# Patient Record
Sex: MALE | Race: WHITE | NOT HISPANIC OR LATINO | Employment: OTHER | URBAN - METROPOLITAN AREA
[De-identification: names, ages, dates, MRNs, and addresses within clinical notes are randomized per-mention and may not be internally consistent; named-entity substitution may affect disease eponyms.]

---

## 2020-04-17 ENCOUNTER — HOSPITAL ENCOUNTER (OUTPATIENT)
Dept: RADIOLOGY | Facility: HOSPITAL | Age: 75
Discharge: HOME | End: 2020-04-17
Attending: ORTHOPAEDIC SURGERY
Payer: COMMERCIAL

## 2020-04-17 VITALS
DIASTOLIC BLOOD PRESSURE: 67 MMHG | HEART RATE: 76 BPM | WEIGHT: 158 LBS | HEIGHT: 65 IN | BODY MASS INDEX: 26.33 KG/M2 | SYSTOLIC BLOOD PRESSURE: 128 MMHG

## 2020-04-17 DIAGNOSIS — M25.512 ACUTE PAIN OF LEFT SHOULDER: ICD-10-CM

## 2020-04-17 DIAGNOSIS — S46.012A ROTATOR CUFF STRAIN, LEFT, INITIAL ENCOUNTER: Primary | ICD-10-CM

## 2020-04-17 PROCEDURE — 73030 X-RAY EXAM OF SHOULDER: CPT

## 2020-04-17 PROCEDURE — 99203 OFFICE O/P NEW LOW 30 MIN: CPT | Performed by: ORTHOPAEDIC SURGERY

## 2020-04-17 PROCEDURE — 20610 DRAIN/INJ JOINT/BURSA W/O US: CPT | Performed by: ORTHOPAEDIC SURGERY

## 2020-04-17 RX ORDER — BETAMETHASONE SODIUM PHOSPHATE AND BETAMETHASONE ACETATE 3; 3 MG/ML; MG/ML
12 INJECTION, SUSPENSION INTRA-ARTICULAR; INTRALESIONAL; INTRAMUSCULAR; SOFT TISSUE
Status: COMPLETED | OUTPATIENT
Start: 2020-04-17 | End: 2020-04-17

## 2020-04-17 RX ORDER — DIPHENOXYLATE HYDROCHLORIDE AND ATROPINE SULFATE 2.5; .025 MG/1; MG/1
1 TABLET ORAL DAILY
COMMUNITY
Start: 2010-11-08

## 2020-04-17 RX ORDER — BUPIVACAINE HYDROCHLORIDE 2.5 MG/ML
2 INJECTION, SOLUTION INFILTRATION; PERINEURAL
Status: COMPLETED | OUTPATIENT
Start: 2020-04-17 | End: 2020-04-17

## 2020-04-17 RX ORDER — LIDOCAINE HYDROCHLORIDE 10 MG/ML
2 INJECTION, SOLUTION INFILTRATION; PERINEURAL
Status: COMPLETED | OUTPATIENT
Start: 2020-04-17 | End: 2020-04-17

## 2020-04-17 RX ADMIN — BUPIVACAINE HYDROCHLORIDE 2 ML: 2.5 INJECTION, SOLUTION INFILTRATION; PERINEURAL at 11:57

## 2020-04-17 RX ADMIN — LIDOCAINE HYDROCHLORIDE 2 ML: 10 INJECTION, SOLUTION INFILTRATION; PERINEURAL at 11:57

## 2020-04-17 RX ADMIN — BETAMETHASONE SODIUM PHOSPHATE AND BETAMETHASONE ACETATE 12 MG: 3; 3 INJECTION, SUSPENSION INTRA-ARTICULAR; INTRALESIONAL; INTRAMUSCULAR; SOFT TISSUE at 11:57

## 2020-04-24 DIAGNOSIS — S43.402A SPRAIN OF LEFT SHOULDER, UNSPECIFIED SHOULDER SPRAIN TYPE, INITIAL ENCOUNTER: Primary | ICD-10-CM

## 2020-04-29 ENCOUNTER — EVALUATION (OUTPATIENT)
Dept: PHYSICAL THERAPY | Facility: CLINIC | Age: 75
End: 2020-04-29
Payer: COMMERCIAL

## 2020-04-29 DIAGNOSIS — S46.012D ROTATOR CUFF STRAIN, LEFT, SUBSEQUENT ENCOUNTER: Primary | ICD-10-CM

## 2020-04-29 DIAGNOSIS — S43.402A SPRAIN OF LEFT SHOULDER, UNSPECIFIED SHOULDER SPRAIN TYPE, INITIAL ENCOUNTER: ICD-10-CM

## 2020-04-29 PROCEDURE — 97110 THERAPEUTIC EXERCISES: CPT | Performed by: PHYSICAL THERAPIST

## 2020-04-29 PROCEDURE — 97162 PT EVAL MOD COMPLEX 30 MIN: CPT | Performed by: PHYSICAL THERAPIST

## 2020-05-06 ENCOUNTER — OFFICE VISIT (OUTPATIENT)
Dept: PHYSICAL THERAPY | Facility: CLINIC | Age: 75
End: 2020-05-06
Payer: COMMERCIAL

## 2020-05-06 DIAGNOSIS — S46.012D ROTATOR CUFF STRAIN, LEFT, SUBSEQUENT ENCOUNTER: Primary | ICD-10-CM

## 2020-05-06 DIAGNOSIS — S43.402A SPRAIN OF LEFT SHOULDER, UNSPECIFIED SHOULDER SPRAIN TYPE, INITIAL ENCOUNTER: ICD-10-CM

## 2020-05-06 PROCEDURE — 97110 THERAPEUTIC EXERCISES: CPT | Performed by: PHYSICAL THERAPIST

## 2020-05-06 PROCEDURE — 97140 MANUAL THERAPY 1/> REGIONS: CPT | Performed by: PHYSICAL THERAPIST

## 2020-05-13 ENCOUNTER — OFFICE VISIT (OUTPATIENT)
Dept: PHYSICAL THERAPY | Facility: CLINIC | Age: 75
End: 2020-05-13
Payer: COMMERCIAL

## 2020-05-13 DIAGNOSIS — S43.402A SPRAIN OF LEFT SHOULDER, UNSPECIFIED SHOULDER SPRAIN TYPE, INITIAL ENCOUNTER: ICD-10-CM

## 2020-05-13 DIAGNOSIS — S46.012D ROTATOR CUFF STRAIN, LEFT, SUBSEQUENT ENCOUNTER: Primary | ICD-10-CM

## 2020-05-13 PROCEDURE — 97140 MANUAL THERAPY 1/> REGIONS: CPT | Performed by: PHYSICAL THERAPIST

## 2020-05-13 PROCEDURE — 97110 THERAPEUTIC EXERCISES: CPT | Performed by: PHYSICAL THERAPIST

## 2020-05-19 ENCOUNTER — APPOINTMENT (OUTPATIENT)
Dept: PHYSICAL THERAPY | Facility: CLINIC | Age: 75
End: 2020-05-19
Payer: COMMERCIAL

## 2020-05-20 ENCOUNTER — APPOINTMENT (OUTPATIENT)
Dept: PHYSICAL THERAPY | Facility: CLINIC | Age: 75
End: 2020-05-20
Payer: COMMERCIAL

## 2020-05-21 ENCOUNTER — OFFICE VISIT (OUTPATIENT)
Dept: PHYSICAL THERAPY | Facility: CLINIC | Age: 75
End: 2020-05-21
Payer: COMMERCIAL

## 2020-05-21 DIAGNOSIS — S46.012D ROTATOR CUFF STRAIN, LEFT, SUBSEQUENT ENCOUNTER: Primary | ICD-10-CM

## 2020-05-21 DIAGNOSIS — S43.402A SPRAIN OF LEFT SHOULDER, UNSPECIFIED SHOULDER SPRAIN TYPE, INITIAL ENCOUNTER: ICD-10-CM

## 2020-05-21 PROCEDURE — 97110 THERAPEUTIC EXERCISES: CPT | Performed by: PHYSICAL THERAPIST

## 2020-05-21 PROCEDURE — 97140 MANUAL THERAPY 1/> REGIONS: CPT | Performed by: PHYSICAL THERAPIST

## 2020-05-28 ENCOUNTER — OFFICE VISIT (OUTPATIENT)
Dept: PHYSICAL THERAPY | Facility: CLINIC | Age: 75
End: 2020-05-28
Payer: COMMERCIAL

## 2020-05-28 DIAGNOSIS — S46.012D ROTATOR CUFF STRAIN, LEFT, SUBSEQUENT ENCOUNTER: Primary | ICD-10-CM

## 2020-05-28 PROCEDURE — 97140 MANUAL THERAPY 1/> REGIONS: CPT | Performed by: PHYSICAL THERAPIST

## 2020-05-28 PROCEDURE — 97110 THERAPEUTIC EXERCISES: CPT | Performed by: PHYSICAL THERAPIST

## 2020-06-04 ENCOUNTER — OFFICE VISIT (OUTPATIENT)
Dept: PHYSICAL THERAPY | Facility: CLINIC | Age: 75
End: 2020-06-04
Payer: COMMERCIAL

## 2020-06-04 DIAGNOSIS — S43.402A SPRAIN OF LEFT SHOULDER, UNSPECIFIED SHOULDER SPRAIN TYPE, INITIAL ENCOUNTER: ICD-10-CM

## 2020-06-04 DIAGNOSIS — S46.012D ROTATOR CUFF STRAIN, LEFT, SUBSEQUENT ENCOUNTER: Primary | ICD-10-CM

## 2020-06-04 PROCEDURE — 97140 MANUAL THERAPY 1/> REGIONS: CPT | Performed by: PHYSICAL THERAPIST

## 2020-06-04 PROCEDURE — 97110 THERAPEUTIC EXERCISES: CPT | Performed by: PHYSICAL THERAPIST

## 2020-06-11 ENCOUNTER — OFFICE VISIT (OUTPATIENT)
Dept: PHYSICAL THERAPY | Facility: CLINIC | Age: 75
End: 2020-06-11
Payer: COMMERCIAL

## 2020-06-11 DIAGNOSIS — S43.402A SPRAIN OF LEFT SHOULDER, UNSPECIFIED SHOULDER SPRAIN TYPE, INITIAL ENCOUNTER: ICD-10-CM

## 2020-06-11 DIAGNOSIS — S46.012D ROTATOR CUFF STRAIN, LEFT, SUBSEQUENT ENCOUNTER: Primary | ICD-10-CM

## 2020-06-11 PROCEDURE — 97140 MANUAL THERAPY 1/> REGIONS: CPT | Performed by: PHYSICAL THERAPIST

## 2020-06-11 PROCEDURE — 97110 THERAPEUTIC EXERCISES: CPT | Performed by: PHYSICAL THERAPIST

## 2020-06-18 ENCOUNTER — OFFICE VISIT (OUTPATIENT)
Dept: PHYSICAL THERAPY | Facility: CLINIC | Age: 75
End: 2020-06-18
Payer: COMMERCIAL

## 2020-06-18 DIAGNOSIS — S43.402A SPRAIN OF LEFT SHOULDER, UNSPECIFIED SHOULDER SPRAIN TYPE, INITIAL ENCOUNTER: ICD-10-CM

## 2020-06-18 DIAGNOSIS — S46.012D ROTATOR CUFF STRAIN, LEFT, SUBSEQUENT ENCOUNTER: Primary | ICD-10-CM

## 2020-06-18 PROCEDURE — 97140 MANUAL THERAPY 1/> REGIONS: CPT | Performed by: PHYSICAL THERAPIST

## 2020-11-13 ENCOUNTER — PREP FOR PROCEDURE (OUTPATIENT)
Dept: GASTROENTEROLOGY | Facility: CLINIC | Age: 75
End: 2020-11-13

## 2020-11-13 DIAGNOSIS — Z86.010 HISTORY OF COLON POLYPS: Primary | ICD-10-CM

## 2021-01-15 ENCOUNTER — TELEMEDICINE (OUTPATIENT)
Dept: GASTROENTEROLOGY | Facility: CLINIC | Age: 76
End: 2021-01-15

## 2021-01-15 VITALS — HEIGHT: 65 IN | BODY MASS INDEX: 26.66 KG/M2 | WEIGHT: 160 LBS

## 2021-01-15 DIAGNOSIS — Z80.0 FHX: COLON CANCER: Primary | ICD-10-CM

## 2021-01-15 NOTE — PROGRESS NOTES
Why does your doctor want you to have this procedure? Hx polyps; fhx colon ca-mother    Do you have kidney disease?  no  If yes, are you on dialysis :     Have you had diverticulitis within the past 2 months? no    Are you diabetic?  no  If yes, insulin dependent: If yes, provide diabetic instructions sheet     Do take iron supplements?  no  If yes, instruct patient to hold iron supplement for 7 days prior    Are you on a blood thinner? no   Was the blood thinner sheet complete and faxed to cardiologist no  Plavix (clopidogrel), Coumadin (warfarin), Lovenox (enoxaparin), Xarelto (rivaroxaban), Pradaxa(dabigatran), Eliquis(apixaban) Savaysa/Lixiana (edoxapan)    Do you have an automatic implantable cardiac defibrillator (AICD)/pacemaker ([facility])? no  Was AICD/pacemaker sheet completed and faxed to cardiologist? no    Are you on home oxygen? no  If yes, continuous or nocturnal:     Have you been treated for MRSA, VRE or any communicable diseases? no    Heart attack, stroke, or stent within 3 months? no  Schedule at Hospital if within 3-6 months   Use nitroglycerin for chest pain in the last 6 months? no    History of organ  transplant?  no   If yes, notify Endo      History of neck/throat/tongue surgery or cancer? no  IF yes, notify Endo      Any problems with anesthesia in the past? no     Was stool C diff ordered?  no Stool specimen needs to be completed prior to procedure    Do have any facial or body piercings?no     Do you have a latex allergy? no     Do have an allergy to metals? (Bravo study only) no     If pediatric patient, was consent faxed to pediatrician no     Patient rights reviewed yes     Colon phone prep completed; suprep instructions reviewed and emailed to pt; rx forwarded to provider

## 2021-01-20 ENCOUNTER — ANESTHESIA EVENT (OUTPATIENT)
Dept: GASTROENTEROLOGY | Facility: AMBULATORY SURGERY CENTER | Age: 76
End: 2021-01-20

## 2021-01-20 ENCOUNTER — HOSPITAL ENCOUNTER (OUTPATIENT)
Dept: GASTROENTEROLOGY | Facility: AMBULATORY SURGERY CENTER | Age: 76
Discharge: HOME | End: 2021-01-20
Payer: COMMERCIAL

## 2021-01-20 ENCOUNTER — ANESTHESIA (OUTPATIENT)
Dept: GASTROENTEROLOGY | Facility: AMBULATORY SURGERY CENTER | Age: 76
End: 2021-01-20

## 2021-01-20 VITALS
RESPIRATION RATE: 18 BRPM | SYSTOLIC BLOOD PRESSURE: 141 MMHG | OXYGEN SATURATION: 98 % | DIASTOLIC BLOOD PRESSURE: 67 MMHG | HEART RATE: 65 BPM | TEMPERATURE: 97.4 F

## 2021-01-20 VITALS — HEART RATE: 54 BPM

## 2021-01-20 DIAGNOSIS — Z86.010 HISTORY OF COLON POLYPS: ICD-10-CM

## 2021-01-20 PROCEDURE — 45385 COLONOSCOPY W/LESION REMOVAL: CPT | Performed by: INTERNAL MEDICINE

## 2021-01-20 PROCEDURE — 88305 TISSUE EXAM BY PATHOLOGIST: CPT | Performed by: PATHOLOGY

## 2021-01-20 RX ORDER — SODIUM CHLORIDE 9 MG/ML
50 INJECTION, SOLUTION INTRAVENOUS CONTINUOUS
Status: DISCONTINUED | OUTPATIENT
Start: 2021-01-20 | End: 2021-01-20

## 2021-01-20 RX ORDER — PROPOFOL 10 MG/ML
INJECTION, EMULSION INTRAVENOUS AS NEEDED
Status: DISCONTINUED | OUTPATIENT
Start: 2021-01-20 | End: 2021-01-20

## 2021-01-20 RX ADMIN — PROPOFOL 50 MG: 10 INJECTION, EMULSION INTRAVENOUS at 10:33

## 2021-01-20 RX ADMIN — PROPOFOL 50 MG: 10 INJECTION, EMULSION INTRAVENOUS at 10:26

## 2021-01-20 RX ADMIN — PROPOFOL 30 MG: 10 INJECTION, EMULSION INTRAVENOUS at 10:37

## 2021-01-20 RX ADMIN — SODIUM CHLORIDE 50 ML/HR: 9 INJECTION, SOLUTION INTRAVENOUS at 10:18

## 2021-01-20 RX ADMIN — PROPOFOL 70 MG: 10 INJECTION, EMULSION INTRAVENOUS at 10:18

## 2021-01-20 RX ADMIN — PROPOFOL 30 MG: 10 INJECTION, EMULSION INTRAVENOUS at 10:31

## 2021-01-20 NOTE — ANESTHESIA POSTPROCEDURE EVALUATION
Post-Op Assessment Note    CV Status:  Stable    Pain management: adequate     Mental Status:  Sleepy   Hydration Status:  Euvolemic and stable   PONV Controlled:  None   Airway Patency:  Patent      Post Op Vitals Reviewed: Yes      Staff: Anesthesiologist         No complications documented      BP      Temp     Pulse     Resp      SpO2

## 2021-01-20 NOTE — ANESTHESIA PREPROCEDURE EVALUATION
Procedure:  COLONOSCOPY    Relevant Problems   CARDIO   (+) PVC's (premature ventricular contractions)      /RENAL   (+) Prostate CA (HCC)      Other   (+) Syncope        Physical Exam    Airway    Mallampati score: II  TM Distance: >3 FB  Neck ROM: full     Dental   No notable dental hx     Cardiovascular  Cardiovascular exam normal    Pulmonary  Pulmonary exam normal     Other Findings        Anesthesia Plan  ASA Score- 2     Anesthesia Type- IV sedation with anesthesia with ASA Monitors  Additional Monitors:   Airway Plan:           Plan Factors-    Chart reviewed  Patient is not a current smoker  Induction- intravenous  Postoperative Plan-     Informed Consent- Anesthetic plan and risks discussed with patient

## 2021-01-20 NOTE — H&P
History and Physical - [facility]  Adeola Donovan 66 y o  male MRN: [id number]    HPI: Adeola Donovan is a 66y o  year old male who presents for surveillance colonoscopy  He has a history of colon polyps and family history of colon cancer    REVIEW OF SYSTEMS: Per the HPI, and otherwise unremarkable  Historical Information   Past Medical History:   Diagnosis Date    Colon polyp     Dermatitis     Prostate CA ([address] )     Rotator cuff injury     left    Syncope     6-7 years ago and 10-15 years ago  Past Surgical History:   Procedure Laterality Date    ANKLE FRACTURE SURGERY      CHOLECYSTECTOMY      COLONOSCOPY      PROSTATE CANCER GENE 3(PCA3) (HISTORICAL)  04/2019     with radiation     Social History   Social History     Substance and Sexual Activity   Alcohol Use Yes    Frequency: 4 or more times a week    Comment: Social     Social History     Substance and Sexual Activity   Drug Use No     Social History     Tobacco Use   Smoking Status Former Smoker    Packs/day: 0 25    Years: 1 00    Pack years: 0 25   Smokeless Tobacco Never Used     Family History   Problem Relation Age of Onset    Colon cancer Mother     Diabetes Father        Meds/Allergies       Current Outpatient Medications:     Multiple Vitamins-Minerals (MULTIVITAMIN WITH MINERALS) tablet    multivitamin (THERAGRAN) TABS    Na Sulfate-K Sulfate-Mg Sulf 17 5-3 13-1 6 GM/177ML SOLN    Current Facility-Administered Medications:     sodium chloride 0 9 % infusion, 50 mL/hr, Intravenous, Continuous    No Known Allergies    Objective     Temp (!) 97 4 °F (36 3 °C) (Temporal)     PHYSICAL EXAM    Gen: NAD AAOx3  CV: S1S2 RRR no m/r/g  CHEST: Clear b/l no c/r/w  ABD: soft, +BS NT/ND  EXT: no edema    ASSESSMENT/PLAN:  This is a 66y o  year old male here for surveillance colonoscopy, and he is stable and optimized for his procedure

## 2021-01-20 NOTE — DISCHARGE INSTRUCTIONS
Hemorrhoids   WHAT YOU NEED TO KNOW:   What are hemorrhoids? Hemorrhoids are swollen blood vessels inside your rectum (internal hemorrhoids) or on your anus (external hemorrhoids)  Sometimes a hemorrhoid may prolapse  This means it extends out of your anus  What increases my risk for hemorrhoids? · Pregnancy or obesity    · Straining or sitting for a long time during bowel movements    · Liver disease    · Weak muscles around the anus caused by older age, rectal surgery, or anal intercourse    · A lack of physical activity    · Chronic diarrhea or constipation    · A low-fiber diet    What are the signs and symptoms of hemorrhoids? · Pain or itching around your anus or inside your rectum    · Swelling or bumps around your anus    · Bright red blood in your bowel movement, on the toilet paper, or in the toilet bowl    · Tissue bulging out of your anus (prolapsed hemorrhoids)    · Incontinence (poor control over urine or bowel movements)    How are hemorrhoids diagnosed? Your healthcare provider will ask about your symptoms, the foods you eat, and your bowel movements  He or she will examine your anus for external hemorrhoids  You may need the following:  · A digital rectal exam  is a test to check for hemorrhoids  Your healthcare provider will put a gloved finger inside your anus to feel for the hemorrhoids  · An anoscopy  is a test that uses a scope (small tube with a light and camera on the end) to look at your hemorrhoids  How are hemorrhoids treated? Treatment will depend on your symptoms  You may need any of the following:  · Medicines  can help decrease pain and swelling, and soften your bowel movement  The medicine may be a pill, pad, cream, or ointment  · Procedures  may be used to shrink or remove your hemorrhoid  Examples include rubber-band ligation, sclerotherapy, and photocoagulation  These procedures may be done in your healthcare provider's office   Ask your healthcare provider for more information about these procedures  · Surgery  may be needed to shrink or remove your hemorrhoids  How can I manage my symptoms? · Apply ice on your anus for 15 to 20 minutes every hour or as directed  Use an ice pack, or put crushed ice in a plastic bag  Cover it with a towel before you apply it to your anus  Ice helps prevent tissue damage and decreases swelling and pain  · Take a sitz bath  Fill a bathtub with 4 to 6 inches of warm water  You may also use a sitz bath pan that fits inside a toilet bowl  Sit in the sitz bath for 15 minutes  Do this 3 times a day, and after each bowel movement  The warm water can help decrease pain and swelling  · Keep your anal area clean  Gently wash the area with warm water daily  Soap may irritate the area  After a bowel movement, wipe with moist towelettes or wet toilet paper  Dry toilet paper can irritate the area  How can I help prevent hemorrhoids? · Do not strain to have a bowel movement  Do not sit on the toilet too long  These actions can increase pressure on the tissues in your rectum and anus  · Drink plenty of liquids  Liquids can help prevent constipation  Ask how much liquid to drink each day and which liquids are best for you  · Eat a variety of high-fiber foods  Examples include fruits, vegetables, and whole grains  Ask your healthcare provider how much fiber you need each day  You may need to take a fiber supplement  · Exercise as directed  Exercise, such as walking, may make it easier to have a bowel movement  Ask your healthcare provider to help you create an exercise plan  · Do not have anal sex  Anal sex can weaken the skin around your rectum and anus  · Avoid heavy lifting  This can cause straining and increase your risk for another hemorrhoid  When should I seek immediate care? · You have severe pain in your rectum or around your anus      · You have severe pain in your abdomen and you are vomiting  · You have bleeding from your anus that soaks through your underwear  When should I contact my healthcare provider? · You have frequent and painful bowel movements  · Your hemorrhoid looks or feels more swollen than usual      · You do not have a bowel movement for 2 days or more  · You see or feel tissue coming through your anus  · You have questions or concerns about your condition or care  CARE AGREEMENT:   You have the right to help plan your care  Learn about your health condition and how it may be treated  Discuss treatment options with your healthcare providers to decide what care you want to receive  You always have the right to refuse treatment  The above information is an  only  It is not intended as medical advice for individual conditions or treatments  Talk to your doctor, nurse or pharmacist before following any medical regimen to see if it is safe and effective for you  © Copyright [address] Information is for End User's use only and may not be sold, redistributed or otherwise used for commercial purposes  All illustrations and images included in CareNotes® are the copyrighted property of [employer]  or [address] [place] [address] Kimberli   Diverticulosis   WHAT YOU NEED TO KNOW:   What is diverticulosis? Diverticulosis is a condition that causes small pockets called diverticula to form in your intestine  These pockets make it difficult for bowel movements to pass through your digestive system  What causes diverticulosis? Diverticula form when muscles have to work hard to move bowel movements through the intestine  The force causes bulges to form at weak areas in the intestine  This may happen if you eat foods that are low in fiber  Fiber helps give your bowel movements more bulk so they are larger and easier to move through your colon   The following may increase your risk of diverticulosis:  · A history of constipation    · Age 36 or older    · Obesity    · Lack of exercise    What are the signs and symptoms of diverticulosis? Diverticulosis usually does not cause any signs or symptoms  It may cause any of the following in some people:  · Pain or discomfort in your lower abdomen    · Abdominal bloating    · Constipation or diarrhea    How is diverticulosis diagnosed? Your healthcare provider will examine you and ask about your bowel movements, diet, and symptoms  He or she will also ask about any medical conditions you have or medicines you take  You may need any of the following:  · Blood tests  may be done to check for signs of inflammation  · A barium enema  is an x-ray of your colon that may show diverticula  A tube is put into your anus, and a liquid called barium is put through the tube  Barium is used so that healthcare providers can see your colon more clearly  · Flexible sigmoidoscopy  is a test to look for any changes in your lower intestines and rectum  It may also show the cause of any bleeding or pain  A soft, bendable tube with a light on the end will be put into your anus  It will then be moved forward into your intestine  · A colonoscopy  is used to look at your whole colon  A scope (long bendable tube with a light on the end) is used to take pictures  This test may show diverticula  · A CT scan , or CAT scan, may show diverticula  You may be given contrast liquid before the scan  Tell the healthcare provider if you have ever had an allergic reaction to contrast liquid  How is diverticulosis managed? The goal of treatment is to manage any symptoms you have and prevent other problems such as diverticulitis  Diverticulitis is swelling or infection of the diverticula  Your healthcare provider may recommend any of the following:  · Eat a variety of high-fiber foods  High-fiber foods help you have regular bowel movements  High-fiber foods include cooked beans, fruits, vegetables, and some cereals   Most adults need 25 to 35 grams of fiber each day  Your healthcare provider may recommend that you have more  Ask your healthcare provider how much fiber you need  Increase fiber slowly  You may have abdominal discomfort, bloating, and gas if you add fiber to your diet too quickly  You may need to take a fiber supplement if you are not getting enough fiber from food  · Medicines  to soften your bowel movements may be given  You may also need medicines to treat symptoms such as bloating and pain  · Drink liquids as directed  You may need to drink 2 to 3 liters (8 to 12 cups) of liquids every day  Ask your healthcare provider how much liquid to drink each day and which liquids are best for you  · Apply heat  on your abdomen for 20 to 30 minutes every 2 hours for as many days as directed  Heat helps decrease pain and muscle spasms  How can I help prevent diverticulitis or other symptoms? The following may help decrease your risk for diverticulitis or symptoms, such as bleeding  Talk to your provider about these or other things you can do to prevent problems that may occur with diverticulosis  · Exercise regularly  Ask your healthcare provider about the best exercise plan for you  Exercise can help you have regular bowel movements  Get 30 minutes of exercise on most days of the week  · Maintain a healthy weight  Ask your healthcare provider how much you should weigh  Ask him or her to help you create a weight loss plan if you are overweight  · Do not smoke  Nicotine and other chemicals in cigarettes increase your risk for diverticulitis  Ask your healthcare provider for information if you currently smoke and need help to quit  E-cigarettes or smokeless tobacco still contain nicotine  Talk to your healthcare provider before you use these products  · Ask your healthcare provider if it is safe to take NSAIDs  NSAIDs may increase your risk of diverticulitis  When should I seek immediate care? · You have severe pain on the left side of your lower abdomen  · Your bowel movements are bright or dark red  When should I contact my healthcare provider? · You have a fever and chills  · You feel dizzy or lightheaded  · You have nausea, or you are vomiting  · You have a change in your bowel movements  · You have questions or concerns about your condition or care  CARE AGREEMENT:   You have the right to help plan your care  Learn about your health condition and how it may be treated  Discuss treatment options with your healthcare providers to decide what care you want to receive  You always have the right to refuse treatment  The above information is an  only  It is not intended as medical advice for individual conditions or treatments  Talk to your doctor, nurse or pharmacist before following any medical regimen to see if it is safe and effective for you  © Copyright [address] Information is for End User's use only and may not be sold, redistributed or otherwise used for commercial purposes  All illustrations and images included in CareNotes® are the copyrighted property of [employer]  or [address] [place] [address]  Colorectal Polyps   WHAT YOU NEED TO KNOW:   What are colorectal polyps? Colorectal polyps are small growths of tissue in the lining of the colon and rectum  Most polyps are hyperplastic polyps and are usually benign (noncancerous)  Certain types of polyps, called adenomatous polyps, may turn into cancer  What increases my risk of colorectal polyps? The exact cause of colorectal polyps is unknown   The following may increase your risk:  · Older age    · A diet of foods high in fat and low in fiber     · Family history of polyps    · Intestinal diseases, such as Crohn's disease or ulcerative colitis    · An unhealthy lifestyle, such as physical inactivity, smoking, or drinking alcohol    · Obesity    What are the signs and symptoms of colorectal polyps? · Blood in your bowel movement or bleeding from the rectum    · Change in bowel movement habits, such as diarrhea and constipation    · Abdominal pain    How are colorectal polyps diagnosed? You should have fecal blood screening once a year for colorectal disease if you are over 48years old  You should be screened earlier if you have an intestinal disease or a family history of polyps or colorectal cancer  During this screening, a sample of your bowel movement is checked for blood, which may be an early sign of colorectal polyps or cancer  You may also need any of the following tests:  · Digital rectal exam:  Your healthcare provider will examine your anus and use a finger to check your rectum for polyps  · Barium enema: A barium enema is an x-ray of the colon  A tube is put into your anus, and a liquid called barium is put through the tube  Barium is used so that healthcare providers can see your colon better on the x-ray film  · Virtual colonoscopy: This is a CT scan that takes pictures of the inside of your colon and rectum  A small, flexible tube is put into your rectum and air or carbon dioxide (gas) is used to expand your colon  This lets healthcare providers clearly see your colon and any polyps on a monitor  · Colonoscopy or sigmoidoscopy: These procedures help your healthcare provider see the inside of your colon using a flexible tube with a small light and camera on the end  During a sigmoidoscopy, your healthcare provider will only look at rectum and lower colon  During a colonoscopy, healthcare providers will look at the full length of your colon  Healthcare providers may remove a small amount of tissue from the colon for a biopsy  How are colorectal polyps treated? A polypectomy is a minimally invasive procedure to remove your polyps  They may be removed during a colonoscopy or sigmoidoscopy  Your healthcare provider may need to remove the polyps with a laparoscope  Laparoscopy is done by inserting a small, flexible scope into incisions made on your abdomen  What are the risks of colorectal polyps? You may bleed during a colonoscopy procedure  Your bowel may be perforated (torn) when polyps are removed  This may lead to an open abdominal surgery  During surgery, you may bleed too much or get an infection  Adenomatous polyps that are not removed may turn into cancer and become more difficult to treat  Where can I find support and more information? · [facility] ([facility]) [address] [place], [place] [address]  Phone: [phone]  Web Address: Robson Osborn  [facility] nih gov    When should I contact my healthcare provider? · You have a fever  · You have chills, a cough, or feel weak and achy  · You have abdominal pain that does not go away or gets worse after you take medicine  · Your abdomen is swollen  · You are losing weight without trying  · You have questions or concerns about your condition or care  When should I seek immediate care or call 911? · You have sudden shortness of breath  · You have a fast heart rate, fast breathing, or are too dizzy to stand up  · You have severe abdominal pain  · You see blood in your bowel movement  CARE AGREEMENT:   You have the right to help plan your care  Learn about your health condition and how it may be treated  Discuss treatment options with your healthcare providers to decide what care you want to receive  You always have the right to refuse treatment  The above information is an  only  It is not intended as medical advice for individual conditions or treatments  Talk to your doctor, nurse or pharmacist before following any medical regimen to see if it is safe and effective for you    © Copyright [address] Information is for End User's use only and may not be sold, redistributed or otherwise used for commercial purposes   All illustrations and images included in CareNotes® are the copyrighted property of [employer]  or Tony Schultz

## 2021-01-25 NOTE — RESULT ENCOUNTER NOTE
I called the patient and discussed pathology results  He had 2 small adenomas likely low risk  No recall colonoscopy given his age  He is in agreement  Copy to the patient's primary care provider    Thank you

## 2021-02-12 DIAGNOSIS — Z23 ENCOUNTER FOR IMMUNIZATION: ICD-10-CM

## 2023-05-17 ENCOUNTER — OFFICE VISIT (OUTPATIENT)
Dept: OBGYN CLINIC | Facility: HOSPITAL | Age: 78
End: 2023-05-17

## 2023-05-17 ENCOUNTER — HOSPITAL ENCOUNTER (OUTPATIENT)
Dept: RADIOLOGY | Facility: HOSPITAL | Age: 78
Discharge: HOME | End: 2023-05-17
Attending: ORTHOPAEDIC SURGERY

## 2023-05-17 VITALS
HEART RATE: 80 BPM | BODY MASS INDEX: 28.82 KG/M2 | WEIGHT: 173 LBS | DIASTOLIC BLOOD PRESSURE: 72 MMHG | HEIGHT: 65 IN | SYSTOLIC BLOOD PRESSURE: 144 MMHG

## 2023-05-17 DIAGNOSIS — M25.461 EFFUSION OF RIGHT KNEE: ICD-10-CM

## 2023-05-17 DIAGNOSIS — M25.561 RIGHT KNEE PAIN, UNSPECIFIED CHRONICITY: ICD-10-CM

## 2023-05-17 DIAGNOSIS — M17.11 PRIMARY OSTEOARTHRITIS OF RIGHT KNEE: ICD-10-CM

## 2023-05-17 DIAGNOSIS — M25.561 RIGHT KNEE PAIN, UNSPECIFIED CHRONICITY: Primary | ICD-10-CM

## 2023-05-17 RX ORDER — BETAMETHASONE SODIUM PHOSPHATE AND BETAMETHASONE ACETATE 3; 3 MG/ML; MG/ML
12 INJECTION, SUSPENSION INTRA-ARTICULAR; INTRALESIONAL; INTRAMUSCULAR; SOFT TISSUE
Status: COMPLETED | OUTPATIENT
Start: 2023-05-17 | End: 2023-05-17

## 2023-05-17 RX ORDER — BUPIVACAINE HYDROCHLORIDE 2.5 MG/ML
2 INJECTION, SOLUTION INFILTRATION; PERINEURAL
Status: COMPLETED | OUTPATIENT
Start: 2023-05-17 | End: 2023-05-17

## 2023-05-17 RX ADMIN — BETAMETHASONE SODIUM PHOSPHATE AND BETAMETHASONE ACETATE 12 MG: 3; 3 INJECTION, SUSPENSION INTRA-ARTICULAR; INTRALESIONAL; INTRAMUSCULAR; SOFT TISSUE at 12:44

## 2023-05-17 RX ADMIN — BUPIVACAINE HYDROCHLORIDE 2 ML: 2.5 INJECTION, SOLUTION INFILTRATION; PERINEURAL at 12:44

## 2023-05-17 NOTE — PROGRESS NOTES
[de-identified] y o male presents for evaluation of rather acute onset of pain and swelling in his right knee  He describes no prodromal injury to the knee  He describes the rapid onset of weightbearing pain in the right knee  He describes pain at the level of right knee joint, the pain is made worse bearing weight, the pain increases with increased activities  In addition he describes a feeling of fullness in the right knee that he has not had prior    Review of Systems  Review of systems negative unless otherwise specified in HPI    Past Medical History  Past Medical History:   Diagnosis Date   • Colon polyp    • Dermatitis    • Prostate CA ([facility] [address] )    • Rotator cuff injury     left   • Syncope     6-7 years ago and 10-15 years ago  Past Surgical History  Past Surgical History:   Procedure Laterality Date   • ANKLE FRACTURE SURGERY     • CHOLECYSTECTOMY     • COLONOSCOPY     • PROSTATE CANCER GENE 3(PCA3) (HISTORICAL)  04/2019     with radiation       Current Medications  Current Outpatient Medications on File Prior to Visit   Medication Sig Dispense Refill   • Multiple Vitamins-Minerals (MULTIVITAMIN WITH MINERALS) tablet Take 1 tablet by mouth daily  • multivitamin (THERAGRAN) TABS Take 1 tablet by mouth daily       No current facility-administered medications on file prior to visit  Recent Labs [facility])  0   Lab Value Date/Time    HCT 41 8 06/08/2016 0959    HGB 14 4 06/08/2016 0959    WBC 13 90 (H) 06/08/2016 0959    HGBA1C 5 8 (H) 10/18/2022 1000         Physical exam  · General: Awake, Alert, Oriented  · Eyes: Pupils equal, round and reactive to light  · Heart: regular rate and rhythm  · Lungs: No audible wheezing  · Abdomen: soft  Examination finds a gait pattern with antalgia  Right hip moves well  Right thigh is devoid of atrophy  Right knee is in valgus orientation  There is a small to moderate-sized effusion  He has intact extensor mechanism and can flex well    There is crepitation with flexion extension in the patellofemoral reticulation, there is bony enlargement tenderness on the lateral joint line, with very little bony enlargement very little tenderness medially  Calf compartments otherwise soft and supple  Toes of the right foot are warm, sensate, and mobile    Imaging  I have personally reviewed standing x-rays of the right knee and my interpretation is as follows:  Bone-on-bone apposition is seen within the lateral compartment: Greater than 50% joint space tenderness seen along the patellofemoral compartment    Procedure  An aspiration injection of corticosteroid is provided for the right knee today  It is documented below        Large joint arthrocentesis: R knee  Universal Protocol:  Consent given by: patient    Supporting Documentation  Indications: pain   Procedure Details  Location: knee - R knee  Needle size: 22 G  Ultrasound guidance: no  Medications administered: 2 mL bupivacaine 0 25 %; 12 mg betamethasone acetate-betamethasone sodium phosphate 6 (3-3) mg/mL    Aspirate amount: 20 mL  Aspirate: clear and yellow    Patient tolerance: patient tolerated the procedure well with no immediate complications  Dressing:  Sterile dressing applied            Assessment/Plan:   78 y o male who has Blackmer arthritis of right knee with onset of pain, dysfunction, and effusion today  All diagnoses were discussed with the patient  Treatment option clued risk, benefits, return discussed in detail  An aspiration injection of corticosteroids and given paly purpose  It is advised, accepted, administer as outlined above    Office follow-up on an as-needed basis is my recommendation

## 2023-06-26 ENCOUNTER — OFFICE VISIT (OUTPATIENT)
Dept: OBGYN CLINIC | Facility: HOSPITAL | Age: 78
End: 2023-06-26
Payer: COMMERCIAL

## 2023-06-26 VITALS — WEIGHT: 173 LBS | HEIGHT: 65 IN | BODY MASS INDEX: 28.82 KG/M2

## 2023-06-26 DIAGNOSIS — M25.461 EFFUSION OF RIGHT KNEE: ICD-10-CM

## 2023-06-26 DIAGNOSIS — M25.561 RIGHT KNEE PAIN, UNSPECIFIED CHRONICITY: ICD-10-CM

## 2023-06-26 DIAGNOSIS — M17.11 PRIMARY OSTEOARTHRITIS OF RIGHT KNEE: Primary | ICD-10-CM

## 2023-06-26 PROCEDURE — 99213 OFFICE O/P EST LOW 20 MIN: CPT | Performed by: ORTHOPAEDIC SURGERY

## 2023-06-26 PROCEDURE — 20610 DRAIN/INJ JOINT/BURSA W/O US: CPT | Performed by: ORTHOPAEDIC SURGERY

## 2023-06-26 RX ORDER — BUPIVACAINE HYDROCHLORIDE 2.5 MG/ML
2 INJECTION, SOLUTION INFILTRATION; PERINEURAL
Status: COMPLETED | OUTPATIENT
Start: 2023-06-26 | End: 2023-06-26

## 2023-06-26 RX ORDER — BETAMETHASONE SODIUM PHOSPHATE AND BETAMETHASONE ACETATE 3; 3 MG/ML; MG/ML
12 INJECTION, SUSPENSION INTRA-ARTICULAR; INTRALESIONAL; INTRAMUSCULAR; SOFT TISSUE
Status: COMPLETED | OUTPATIENT
Start: 2023-06-26 | End: 2023-06-26

## 2023-06-26 RX ADMIN — BUPIVACAINE HYDROCHLORIDE 2 ML: 2.5 INJECTION, SOLUTION INFILTRATION; PERINEURAL at 14:15

## 2023-06-26 RX ADMIN — BETAMETHASONE SODIUM PHOSPHATE AND BETAMETHASONE ACETATE 12 MG: 3; 3 INJECTION, SUSPENSION INTRA-ARTICULAR; INTRALESIONAL; INTRAMUSCULAR; SOFT TISSUE at 14:15

## 2023-06-26 NOTE — PROGRESS NOTES
[de-identified] y o male presents for follow-up  An aspiration injection of corticosteroid to an arthritic right knee has resulted in significant resolution of his weightbearing right knee symptoms  He does admit to return of low-grade weightbearing pain  He has pain at the level of right knee joint, pain is made worse bearing weight, pain increases with increased activities  He is contemplating right knee replacement surgery    Review of Systems  Review of systems negative unless otherwise specified in HPI    Past Medical History  Past Medical History:   Diagnosis Date   • Colon polyp    • Dermatitis    • Prostate CA ([facility] [address] )    • Rotator cuff injury     left   • Syncope     6-7 years ago and 10-15 years ago  Past Surgical History  Past Surgical History:   Procedure Laterality Date   • ANKLE FRACTURE SURGERY     • CHOLECYSTECTOMY     • COLONOSCOPY     • PROSTATE CANCER GENE 3(PCA3) (HISTORICAL)  04/2019     with radiation       Current Medications  Current Outpatient Medications on File Prior to Visit   Medication Sig Dispense Refill   • Multiple Vitamins-Minerals (MULTIVITAMIN WITH MINERALS) tablet Take 1 tablet by mouth daily  • multivitamin (THERAGRAN) TABS Take 1 tablet by mouth daily       No current facility-administered medications on file prior to visit  Recent Labs [facility])  0   Lab Value Date/Time    HCT 41 8 06/08/2016 0959    HGB 14 4 06/08/2016 0959    WBC 13 90 (H) 06/08/2016 0959    HGBA1C 5 8 (H) 10/18/2022 1000         Physical exam  · General: Awake, Alert, Oriented  · Eyes: Pupils equal, round and reactive to light  · Heart: regular rate and rhythm  · Lungs: No audible wheezing  · Abdomen: soft  Gait pattern is without antalgia  Right hip moves well  Right thigh has no atrophy  Right knee has intact soft tissue envelope  There is a very small effusion  The knee is in valgus  There is no palpable warmth  He has good extension and can flex well    There is bony enlargement and tenderness laterally  There is crepitation flexion-extension  There is no palp warmth of the synovium  Right ankle is healed scars, the foot does strongly dorsiflex    Imaging  No new x-rays accompany him    Procedure  An aspiration and injection of corticosteroid is performed today  It is documented below      Large joint arthrocentesis: R knee  Universal Protocol:  Consent given by: patient    Supporting Documentation  Indications: pain   Procedure Details  Location: knee - R knee  Needle size: 22 G  Ultrasound guidance: no  Medications administered: 2 mL bupivacaine 0 25 %; 12 mg betamethasone acetate-betamethasone sodium phosphate 6 (3-3) mg/mL    Aspirate amount: 10 mL  Aspirate: clear and yellow    Patient tolerance: patient tolerated the procedure well with no immediate complications  Dressing:  Sterile dressing applied            Assessment/Plan:   78 y o male has return of symptomatic osteoarthritis in the right knee  In addition he has an effusion today  All diagnoses were discussed with the patient  Treatment option clued risk, benefits, return discussed in detail  An aspiration injection of corticosteroid is indicated for paly purposes  It is advised, accepted, administer as outlined above    We will see him back in the office in a month or so, he may wish to consider elective right knee replacement as a cure for his current weightbearing symptoms in the right knee

## 2023-07-25 ENCOUNTER — LAB REQUISITION (OUTPATIENT)
Dept: LAB | Facility: HOSPITAL | Age: 78
End: 2023-07-25
Payer: COMMERCIAL

## 2023-07-25 ENCOUNTER — TELEPHONE (OUTPATIENT)
Dept: OBGYN CLINIC | Facility: MEDICAL CENTER | Age: 78
End: 2023-07-25

## 2023-07-25 ENCOUNTER — APPOINTMENT (OUTPATIENT)
Dept: LAB | Facility: CLINIC | Age: 78
End: 2023-07-25
Payer: COMMERCIAL

## 2023-07-25 ENCOUNTER — OFFICE VISIT (OUTPATIENT)
Dept: OBGYN CLINIC | Facility: HOSPITAL | Age: 78
End: 2023-07-25
Payer: COMMERCIAL

## 2023-07-25 ENCOUNTER — TELEPHONE (OUTPATIENT)
Dept: ANESTHESIOLOGY | Facility: CLINIC | Age: 78
End: 2023-07-25

## 2023-07-25 VITALS
DIASTOLIC BLOOD PRESSURE: 84 MMHG | SYSTOLIC BLOOD PRESSURE: 161 MMHG | HEIGHT: 65 IN | BODY MASS INDEX: 28.99 KG/M2 | WEIGHT: 174 LBS | HEART RATE: 76 BPM

## 2023-07-25 DIAGNOSIS — M17.11 PRIMARY OSTEOARTHRITIS OF RIGHT KNEE: ICD-10-CM

## 2023-07-25 DIAGNOSIS — M25.561 CHRONIC PAIN OF RIGHT KNEE: Primary | ICD-10-CM

## 2023-07-25 DIAGNOSIS — Z47.89 ENCOUNTER FOR OTHER ORTHOPEDIC AFTERCARE: ICD-10-CM

## 2023-07-25 DIAGNOSIS — Z48.89 ENCOUNTER FOR OTHER SPECIFIED SURGICAL AFTERCARE: ICD-10-CM

## 2023-07-25 DIAGNOSIS — M16.11 UNILATERAL PRIMARY OSTEOARTHRITIS, RIGHT HIP: ICD-10-CM

## 2023-07-25 DIAGNOSIS — G89.29 CHRONIC PAIN OF RIGHT KNEE: Primary | ICD-10-CM

## 2023-07-25 LAB
ABO GROUP BLD: NORMAL
BLD GP AB SCN SERPL QL: NEGATIVE
EST. AVERAGE GLUCOSE BLD GHB EST-MCNC: 126 MG/DL
HBA1C MFR BLD: 6 %
RH BLD: POSITIVE
SPECIMEN EXPIRATION DATE: NORMAL

## 2023-07-25 PROCEDURE — 36415 COLL VENOUS BLD VENIPUNCTURE: CPT

## 2023-07-25 PROCEDURE — 83036 HEMOGLOBIN GLYCOSYLATED A1C: CPT

## 2023-07-25 PROCEDURE — 86850 RBC ANTIBODY SCREEN: CPT | Performed by: ORTHOPAEDIC SURGERY

## 2023-07-25 PROCEDURE — 86901 BLOOD TYPING SEROLOGIC RH(D): CPT | Performed by: ORTHOPAEDIC SURGERY

## 2023-07-25 PROCEDURE — 99214 OFFICE O/P EST MOD 30 MIN: CPT | Performed by: ORTHOPAEDIC SURGERY

## 2023-07-25 PROCEDURE — 86900 BLOOD TYPING SEROLOGIC ABO: CPT | Performed by: ORTHOPAEDIC SURGERY

## 2023-07-25 RX ORDER — CHLORHEXIDINE GLUCONATE 0.12 MG/ML
15 RINSE ORAL ONCE
OUTPATIENT
Start: 2023-07-25 | End: 2023-07-25

## 2023-07-25 RX ORDER — ASCORBIC ACID 500 MG
500 TABLET ORAL DAILY
Qty: 30 TABLET | Refills: 0 | Status: SHIPPED | OUTPATIENT
Start: 2023-07-25

## 2023-07-25 RX ORDER — FOLIC ACID 1 MG/1
1 TABLET ORAL DAILY
Qty: 30 TABLET | Refills: 0 | Status: SHIPPED | OUTPATIENT
Start: 2023-07-25

## 2023-07-25 RX ORDER — FERROUS SULFATE TAB EC 324 MG (65 MG FE EQUIVALENT) 324 (65 FE) MG
324 TABLET DELAYED RESPONSE ORAL
Qty: 60 TABLET | Refills: 0 | Status: SHIPPED | OUTPATIENT
Start: 2023-07-25

## 2023-07-25 RX ORDER — TRANEXAMIC ACID 10 MG/ML
1000 INJECTION, SOLUTION INTRAVENOUS ONCE
OUTPATIENT
Start: 2023-07-25 | End: 2023-07-25

## 2023-07-25 RX ORDER — ENOXAPARIN SODIUM 100 MG/ML
40 INJECTION SUBCUTANEOUS DAILY
Qty: 11.2 ML | Refills: 0 | Status: SHIPPED | OUTPATIENT
Start: 2023-07-25 | End: 2023-08-22

## 2023-07-25 RX ORDER — ACETAMINOPHEN 325 MG/1
975 TABLET ORAL ONCE
OUTPATIENT
Start: 2023-07-25 | End: 2023-07-25

## 2023-07-25 RX ORDER — SODIUM CHLORIDE, SODIUM LACTATE, POTASSIUM CHLORIDE, CALCIUM CHLORIDE 600; 310; 30; 20 MG/100ML; MG/100ML; MG/100ML; MG/100ML
125 INJECTION, SOLUTION INTRAVENOUS CONTINUOUS
OUTPATIENT
Start: 2023-07-25

## 2023-07-25 RX ORDER — GABAPENTIN 300 MG/1
300 CAPSULE ORAL ONCE
OUTPATIENT
Start: 2023-07-25 | End: 2023-07-25

## 2023-07-25 RX ORDER — CEFAZOLIN SODIUM 2 G/50ML
2000 SOLUTION INTRAVENOUS ONCE
OUTPATIENT
Start: 2023-07-25 | End: 2023-07-25

## 2023-07-25 NOTE — PROGRESS NOTES
Assessment:  1. Chronic pain of right knee        2. Primary osteoarthritis of right knee        3. Encounter for other orthopedic aftercare        4. Encounter for other specified surgical aftercare            Plan:  Right knee osteoarthritis. The patient will be scheduled for right total knee arthroplasty. We feel the patient will find significant relief per current symptoms, findings on imaging and exam.  Risks, benefits, precautions and expectations were discussed. Risks include blood loss, potential infection and blood clots. Preoperative vitamins were prescribed. The patient should follow up after surgery. To do next visit:  Return for post-op with x-rays. The above stated was discussed in layman's terms and the patient expressed understanding. All questions were answered to the patient's satisfaction. Scribe Attestation    I,:  Courtney Salmeron am acting as a scribe while in the presence of the attending physician.:       I,:  Deann Mirza MD personally performed the services described in this documentation    as scribed in my presence.:             Subjective:   Ashish Long is a 78 y.o. male who presents for follow up of right knee. He is s/p right knee steroid injection with one day of relief, 6/26/2023. Today he complains of severe right anterolateral and generalized knee pain. Prolonged weight bearing and repetitive bending aggravates while rest alleviates. His pain effects his daily activity and sleep. He has used oral medications, tried activity modification and steroid injection with limited benefit. Review of systems negative unless otherwise specified in HPI    Past Medical History:   Diagnosis Date   • Colon polyp    • Dermatitis    • Prostate CA [facility])    • Rotator cuff injury     left   • Syncope     6-7 years ago and 10-15 years ago.        Past Surgical History:   Procedure Laterality Date   • ANKLE FRACTURE SURGERY     • CHOLECYSTECTOMY     • COLONOSCOPY     • PROSTATE CANCER GENE 3(PCA3) (HISTORICAL)  04/2019     with radiation       Family History   Problem Relation Age of Onset   • Colon cancer Mother    • Diabetes Father        Social History     Occupational History   • Not on file   Tobacco Use   • Smoking status: Former     Packs/day: 0.25     Years: 1.00     Total pack years: 0.25     Types: Cigarettes   • Smokeless tobacco: Never   Vaping Use   • Vaping Use: Never used   Substance and Sexual Activity   • Alcohol use: Yes     Comment: Social   • Drug use: No   • Sexual activity: Not on file         Current Outpatient Medications:   •  Multiple Vitamins-Minerals (MULTIVITAMIN WITH MINERALS) tablet, Take 1 tablet by mouth daily. , Disp: , Rfl:   •  multivitamin (THERAGRAN) TABS, Take 1 tablet by mouth daily, Disp: , Rfl:     No Known Allergies         Vitals:    07/25/23 1056   BP: 161/84   Pulse: 76       Objective:  Physical exam  · General: Awake, Alert, Oriented  · Eyes: Pupils equal, round and reactive to light  · Heart: regular rate and rhythm  · Lungs: No audible wheezing  · Abdomen: soft                    Ortho Exam  Right knee:  Valgus alignment  No erythema or ecchymosis  No effusion or swelling  Normal strength  Good ROM with crepitus   Calf compartments soft and supple  Sensation intact  Toes are warm sensate and mobile      Diagnostics, reviewed and taken today if performed as documented: The attending physician has personally reviewed the pertinent films in PACS and interpretation is as follows:  Right knee x-ray of 5/17/2023:  Bone on bone lateral compartment apposition and moderate-severe patellofemoral arthritic changes with decreased joint space, subchondral sclerosis and osteophyte formation. Procedures, if performed today:    Procedures    None performed      Portions of the record may have been created with voice recognition software.   Occasional wrong word or "sound a like" substitutions may have occurred due to the inherent limitations of voice recognition software. Read the chart carefully and recognize, using context, where substitutions have occurred.

## 2023-07-25 NOTE — TELEPHONE ENCOUNTER
Caller: Amirah Duncan at Lab in [facility]: Alyssa Beauchamp    Reason for call: Amirah Carbon calling about a lab test, called the office to speak to someone,   As I was speaking to the office and the call dropped and I could not get her phone number.     Call back#: n/a

## 2023-07-28 ENCOUNTER — TELEPHONE (OUTPATIENT)
Dept: OBGYN CLINIC | Facility: HOSPITAL | Age: 78
End: 2023-07-28

## 2023-08-02 ENCOUNTER — CONSULT (OUTPATIENT)
Dept: ANESTHESIOLOGY | Facility: CLINIC | Age: 78
End: 2023-08-02
Payer: COMMERCIAL

## 2023-08-02 ENCOUNTER — LAB REQUISITION (OUTPATIENT)
Dept: LAB | Facility: HOSPITAL | Age: 78
End: 2023-08-02
Payer: COMMERCIAL

## 2023-08-02 ENCOUNTER — APPOINTMENT (OUTPATIENT)
Dept: LAB | Facility: CLINIC | Age: 78
End: 2023-08-02
Payer: COMMERCIAL

## 2023-08-02 ENCOUNTER — TELEPHONE (OUTPATIENT)
Dept: RHEUMATOLOGY | Facility: CLINIC | Age: 78
End: 2023-08-02

## 2023-08-02 DIAGNOSIS — Z01.818 PREOPERATIVE CLEARANCE: Primary | ICD-10-CM

## 2023-08-02 DIAGNOSIS — Z01.89 ENCOUNTER FOR GERIATRIC ASSESSMENT: Primary | ICD-10-CM

## 2023-08-02 DIAGNOSIS — M17.11 PRIMARY OSTEOARTHRITIS OF RIGHT KNEE: ICD-10-CM

## 2023-08-02 DIAGNOSIS — M17.11 UNILATERAL PRIMARY OSTEOARTHRITIS, RIGHT KNEE: ICD-10-CM

## 2023-08-02 PROBLEM — E78.2 MIXED HYPERLIPIDEMIA: Status: ACTIVE | Noted: 2021-11-23

## 2023-08-02 LAB
ABO GROUP BLD: NORMAL
ANION GAP SERPL CALCULATED.3IONS-SCNC: 5 MMOL/L
BLD GP AB SCN SERPL QL: NEGATIVE
BUN SERPL-MCNC: 25 MG/DL (ref 5–25)
CALCIUM SERPL-MCNC: 8.9 MG/DL (ref 8.3–10.1)
CHLORIDE SERPL-SCNC: 107 MMOL/L (ref 96–108)
CO2 SERPL-SCNC: 27 MMOL/L (ref 21–32)
CREAT SERPL-MCNC: 1.17 MG/DL (ref 0.6–1.3)
ERYTHROCYTE [DISTWIDTH] IN BLOOD BY AUTOMATED COUNT: 13.1 % (ref 11.6–15.1)
GFR SERPL CREATININE-BSD FRML MDRD: 58 ML/MIN/1.73SQ M
GLUCOSE P FAST SERPL-MCNC: 92 MG/DL (ref 65–99)
HCT VFR BLD AUTO: 41.5 % (ref 36.5–49.3)
HGB BLD-MCNC: 13.9 G/DL (ref 12–17)
INR PPP: 1.05 (ref 0.84–1.19)
MCH RBC QN AUTO: 31.4 PG (ref 26.8–34.3)
MCHC RBC AUTO-ENTMCNC: 33.5 G/DL (ref 31.4–37.4)
MCV RBC AUTO: 94 FL (ref 82–98)
PLATELET # BLD AUTO: 229 THOUSANDS/UL (ref 149–390)
PMV BLD AUTO: 10.8 FL (ref 8.9–12.7)
POTASSIUM SERPL-SCNC: 4.2 MMOL/L (ref 3.5–5.3)
PROTHROMBIN TIME: 13.9 SECONDS (ref 11.6–14.5)
RBC # BLD AUTO: 4.42 MILLION/UL (ref 3.88–5.62)
RH BLD: POSITIVE
SODIUM SERPL-SCNC: 139 MMOL/L (ref 135–147)
SPECIMEN EXPIRATION DATE: NORMAL
WBC # BLD AUTO: 6.04 THOUSAND/UL (ref 4.31–10.16)

## 2023-08-02 PROCEDURE — 85610 PROTHROMBIN TIME: CPT

## 2023-08-02 PROCEDURE — 86900 BLOOD TYPING SEROLOGIC ABO: CPT | Performed by: PHYSICIAN ASSISTANT

## 2023-08-02 PROCEDURE — 99213 OFFICE O/P EST LOW 20 MIN: CPT | Performed by: NURSE PRACTITIONER

## 2023-08-02 PROCEDURE — 86901 BLOOD TYPING SEROLOGIC RH(D): CPT | Performed by: PHYSICIAN ASSISTANT

## 2023-08-02 PROCEDURE — 36415 COLL VENOUS BLD VENIPUNCTURE: CPT

## 2023-08-02 PROCEDURE — 86850 RBC ANTIBODY SCREEN: CPT | Performed by: PHYSICIAN ASSISTANT

## 2023-08-02 PROCEDURE — 85027 COMPLETE CBC AUTOMATED: CPT

## 2023-08-02 PROCEDURE — 80048 BASIC METABOLIC PNL TOTAL CA: CPT

## 2023-08-02 NOTE — PATIENT INSTRUCTIONS
Contact surgical nurse  navigator with any questions regarding preoperative plan or schedule.   Stop all over the counter supplements, herbal, naturopathic  medications for 1 week prior to surgery UNLESS prescribed by your surgeon  Hold NSAIDS (i.e. advil, alleve, motrin, ibuprofen, celebrex) minimum 7 days prior to surgery  Hold Asprin minimum 7 days prior to surgery  Recommend using Tylenol ( acetaminophen ) 500mg every eight hours during the first week post discharge in conjunction with any additional pain medicine prescribed by your surgeon  Use bowel medicines prescribed by your surgeon ( colace) daily post op during the first 1-2 weeks to avoid post operative constipation issues  Call [phone] with any post discharge concerns or medical issues  The morning of surgery take only the following medication with small sip of water: none

## 2023-08-02 NOTE — PROGRESS NOTES
Internal Medicine Pre-Operative Evaluation:     Reason for Visit: Pre-operative Evaluation for Risk Stratification and Optimization    Patient ID: Derik Saeed is a 78 y.o. male. Surgery: Arthroplasty of right knee  Referring Provider: Dr Catrina Wing      Recommendations to Proceed withSurgery    Patient is considered to be Low risk for Medium risk procedure. After evaluation and discussion with patient with emphasis that all surgery has some degree of inherent risk it is determined this procedure is of acceptable risk  medically. Patient may proceed with planned procedure      Assessment      Primary osteoarthritis right knee  • Failed conservative measures  • Electing to undergo total joint arthroplasty    Pre-operative Medical Evaluation for planned surgery  • Patient meets preoperative quality goals as noted below  • Recommendations as listed in PLAN section below  • Contact surgical nurse  navigator with any questions regarding preoperative plan or schedule. H/o prostate CA  · Monitor for post operative retention  · Stable    Impaired fasting glucose  • Hgb A1c 6.0  • Recommend following DM diet  • Monitor FBS        Patient Active Problem List   Diagnosis   • Syncope   • PVC's (premature ventricular contractions)   • Closed head injury   • Contusion of jaw   • Rotator cuff strain, left, initial encounter   • Prostate CA [facility])   • Erectile dysfunction   • History of colonic polyps   • Impaired fasting glucose   • Mixed hyperlipidemia        Plan:     1. Further preoperative workup as follows:   - none no further testing required may proceed with surgery    2. Medication Management/Recommendations:   - continue all current medicines through morning of surgery with sip of water except the following:  - hold aspirin 7 days prior to surgery  - avoid use of NSAID such as motrin, advil, aleve for 7 days prior to surgery  - hold all OTC herbal or nutritional supplements 7 days before surgery    3. Patient requires further consultation with:   No Consults Required    4. Discharge Planning / Barriers to Discharge  none identified - patients has post discharge therapy plan in place, transportation arranged for discharge day, adequate family support at home to assist with discharge to home. Subjective:           History of Present Illness:     Harris Bain is a 78 y.o. male who presents to the office today for a preoperative consultation at the request of surgeon. The patient understands this is an elective procedure and not emergent. They are electing to undergo planned procedure with an understanding that all surgery has inherent risk. They have worked with their surgeon and failed conservative treatment measures. Today they present for preoperative risk assessment and recommendations for optimization in preparation for surgery. Needs to complete labs/ekg        ROS: No TIA's or unusual headaches, no dysphagia. No prolonged cough. No dyspnea or chest pain on exertion. No abdominal pain, change in bowel habits, black or bloody stools. No urinary tract or BPH symptoms. Positive reported pain in arthritic joint. Positive difficulty with gait. No skin rashes or issues.             Objective:      /77   Pulse 66   Ht 5' 5" (1.651 m)   Wt 78.9 kg (174 lb)   BMI 28.96 kg/m²       General Appearance: no distress, conversive  HEENT: PERRLA, conjuctiva normal; oropharynx clear; mucous membranes moist;   Neck:  Supple, no lymphadenopathy or thyromegaly  Lungs: breath sounds normal, normal respiratory effort, no retractions, expiratory effort normal  CV: normal heart sounds S1/S2, PMI normal   ABD: soft non tender, no masses , no hepatic or splenomegaly  EXT: DP pulses intact, no lymphadenopathy, no edema  Skin: normal turgor, normal texture, no rash  Psych: affect normal, mood normal  Neuro: AAOx3        The following portions of the patient's history were reviewed and updated as appropriate: allergies, current medications, past family history, past medical history, past social history, past surgical history and problem list.     Past History:       Past Medical History:   Diagnosis Date   • Colon polyp    • Dermatitis    • Prostate CA ([address])    • Rotator cuff injury     left   • Syncope     6-7 years ago and 10-15 years ago. Past Surgical History:   Procedure Laterality Date   • ANKLE FRACTURE SURGERY     • CHOLECYSTECTOMY     • COLONOSCOPY     • PROSTATE CANCER GENE 3(PCA3) (HISTORICAL)  04/2019     with radiation          Social History     Tobacco Use   • Smoking status: Former     Packs/day: 0.25     Years: 1.00     Total pack years: 0.25     Types: Cigarettes   • Smokeless tobacco: Never   Vaping Use   • Vaping Use: Never used   Substance Use Topics   • Alcohol use: Yes     Comment: Social   • Drug use: No     Family History   Problem Relation Age of Onset   • Colon cancer Mother    • Diabetes Father           Allergies:     No Known Allergies     Current Medications:     Current Outpatient Medications   Medication Instructions   • ascorbic acid (VITAMIN C) 500 mg, Oral, Daily, Start 30 days prior to surgery   • enoxaparin (LOVENOX) 40 mg, Subcutaneous, Daily, Start injections after surgery   • ferrous sulfate 324 mg, Oral, 2 times daily before meals, Start 30 days prior to surgery   • folic acid (FOLVITE) 1 mg, Oral, Daily, Start 30 days prior to surgery   • Multiple Vitamins-Minerals (MULTIVITAMIN WITH MINERALS) tablet 1 tablet, Oral, Daily             PRE-OP WORKSHEET DATA    Assessment of Pre-Operative Risks     MLJ Quality Hard Stops:  BMI (<40) : Estimated body mass index is 28.96 kg/m² as calculated from the following:    Height as of this encounter: 5' 5" (1.651 m). Weight as of this encounter: 78.9 kg (174 lb). Hgb ( >11):    Lab Results   Component Value Date    HGB 13.9 08/02/2023     HbA1c (<7.0) :   Lab Results   Component Value Date    HGBA1C 6.0 (H) 07/25/2023     GFR (>60) (Less then 45 = Nephrology consult):  Estimated Creatinine Clearance: 48.8 mL/min (by C-G formula based on SCr of 1.17 mg/dL). Active Decompensated Chronic Conditions which would delay surgery  No acutely decompensated medical issues such as recent CVA, MI, new onset arrhythmia, severe aortic stenosis, CHF, uncontrolled COPD       Exercise Capacity: (if less the 4 mets consider functional assessment via cardiac stress testing or consultation)    · Able to walk 2 blocks without symptoms?: Yes  · Able to walk 1 flights without symptoms?: Yes    Assessment of intra and post operative respiratory, hemodynamic and thrombotic risks     Prior Anesthesia Reactions: No     Personal history of venous thromboembolic disease? No    History of steroid use > 5 mg for >2 weeks within last year? No    Cardiac Risk Estimation: per the Revised Cardiac Risk Index (Circ. 100:1043, 1999),     The patient's risk factors for cardiac complications include :  none    Lexx Bone has a in hospital cardiac risk of RCI RISK CLASS I (0 risk factors, risk of major cardiac compl. appr. 0.5%) based on RCRI calculator          Pre-Op Data Reviewed:       Laboratory Results: I have personally reviewed the pertinent laboratory results/reports     EKG:I have personally reviewed pertinent reports. . I personally reviewed and interpreted available tracings in the electronic medical record    needs EKG    OLD RECORDS: reviewed old records in the chart review section if EHR on day of visit.     Previous cardiopulmonary studies within the past year:  · Echocardiogram: no  · Cardiac Catheterization: no  · Stress Test: no      Time of visit including pre-visit chart review, visit and post-visit coordination of plan and care , review of pre-surgical lab work, preparation and time spent documenting note in electronic medical record, time spent face-to-face in physical examination answering patient questions by care team 45 minutes [address] [facility]

## 2023-08-03 ENCOUNTER — APPOINTMENT (OUTPATIENT)
Dept: LAB | Facility: CLINIC | Age: 78
End: 2023-08-03
Payer: COMMERCIAL

## 2023-08-03 ENCOUNTER — OFFICE VISIT (OUTPATIENT)
Age: 78
End: 2023-08-03
Payer: COMMERCIAL

## 2023-08-03 VITALS
HEART RATE: 66 BPM | BODY MASS INDEX: 28.99 KG/M2 | WEIGHT: 174 LBS | HEIGHT: 65 IN | SYSTOLIC BLOOD PRESSURE: 155 MMHG | DIASTOLIC BLOOD PRESSURE: 77 MMHG

## 2023-08-03 DIAGNOSIS — I49.3 PVC'S (PREMATURE VENTRICULAR CONTRACTIONS): ICD-10-CM

## 2023-08-03 DIAGNOSIS — C61 PROSTATE CA (HCC): ICD-10-CM

## 2023-08-03 DIAGNOSIS — Z01.818 PREOPERATIVE CLEARANCE: ICD-10-CM

## 2023-08-03 DIAGNOSIS — E78.2 MIXED HYPERLIPIDEMIA: ICD-10-CM

## 2023-08-03 DIAGNOSIS — R73.01 IMPAIRED FASTING GLUCOSE: Primary | ICD-10-CM

## 2023-08-03 PROBLEM — Z01.89 ENCOUNTER FOR GERIATRIC ASSESSMENT: Status: ACTIVE | Noted: 2023-08-03

## 2023-08-03 LAB
ATRIAL RATE: 74 BPM
P AXIS: 60 DEGREES
PR INTERVAL: 144 MS
QRS AXIS: 20 DEGREES
QRSD INTERVAL: 94 MS
QT INTERVAL: 406 MS
QTC INTERVAL: 450 MS
T WAVE AXIS: 6 DEGREES
VENTRICULAR RATE: 74 BPM

## 2023-08-03 PROCEDURE — 99204 OFFICE O/P NEW MOD 45 MIN: CPT | Performed by: INTERNAL MEDICINE

## 2023-08-03 PROCEDURE — 93010 ELECTROCARDIOGRAM REPORT: CPT | Performed by: INTERNAL MEDICINE

## 2023-08-03 NOTE — PROGRESS NOTES
THE [facility])  CONSULT: GERIATRIC SURGERY  TELEMEDICINE  Brief Visit    This Visit is being completed by telephone. The Patient is located at Home and in the following state in which I hold an active license [place]    The patient was identified by name and date of birth. Bradley Washburn was informed that this is a telemedicine visit and that the visit is being conducted through Telephone. My office door was closed. No one else was in the room. He acknowledged consent and understanding of privacy and security of the Platform. The patient has agreed to participate and understands they can discontinue the visit at any time. Patient is aware this is a billable service. Assessment/Plan: This [facility] appointment is strictly for a geriatric assessment 2.2 advanced age. Patient was previously seen in [facility] by Dr. Solomon Peña and his team for medical clearance. Please refer to his note for medical history. Consult concerns: age   Patient is scheduled on   Case: [id number] Date/Time: 08/24/23 0930   Procedure: ARTHROPLASTY KNEE TOTAL W ROBOT (Right: Knee)   Anesthesia type: Spinal w/ Femoral Nerve Block   Diagnosis: Primary osteoarthritis of right knee [M17.11]   Pre-op diagnosis: Primary osteoarthritis of right knee [M17.11]   Location: BE OR ROOM 18 / [address] anesthesia   Reports no problems     Problem List Items Addressed This Visit        Other    Encounter for geriatric assessment - Primary  · No immediate geriatric concerns today   · No cognitive concerns today   · Recommend inpatient geriatric consult 2.2 advanced age-   · consult should not hold up discharge  See Geriatric Assessment below. ..   Cognitive Assessment: no concerns   TUG <15 sec: yes   Falls (last 6 months): no    Jeffry Total Score: 23  PHQ- 9 Depression Scale: zero   Nutrition Assessment Score:14  METS:9  Goes to the gym 3 times a week   "I'm 78 going on 68"    Delirium RT age, inpatient surgery  Inpatient geriatric consult ordered for post-op   Consult should not hold up discharge   Delirium precautions on admit    High risk for post- op pneumonia RT age, inpatient surgery  Instructed to start lung exercises tonight through deep breathing   Non smoker     travis needs   Nutritional supplements- increase protein prior to surgery   On admission aspiration precautions, skin precautions, delirium precautions, fall precautions    Be your BEST pathway   · Breath, eat, sleep/stress relief, and tracking exercise            Other Visit Diagnoses     Primary osteoarthritis of right knee      · Seen for surgical optimization   · Seen for geriatric screening   · Received MC   · Admits to being in well health   · METS 9   · Denies CP/SOB - active   · Started on BEST protocol   At risk for post-op FER -no   At risk for post-op SSI -no  BEST   Breathing- instructed to exercise lungs prior to surgery via deep breathing   Eat- discussed increasing protein intake prior to surgery   Sleep/stress- encouraged 8-10 sleep @ night, stress reduction, avoid sick contacts and handwashing   · Train- encouraged to remain active           Recent Visits  No visits were found meeting these conditions. Showing recent visits within past 7 days and meeting all other requirements  Future Appointments  No visits were found meeting these conditions. Showing future appointments within next 150 days and meeting all other requirements       ROS  Denies fevers and denies chills  Denies congestion and denies sore throat  Denies chest pain  Denies palpitations  Denies shortness of breath  Denies abdominal pain  Denies nausea, vomiting, and diarrhea  Denies any issues with their skin. .. example NO open wounds or sores  Denies rashes  Denies difficulty urinating  Denies any issues with their urine. .. example a dark color or an odor  Denies dizziness  Denies headaches  Denies confusion and denies hallucinations    Admits to being in well health today        Physical Assessment   • We did not connect via video  • Patient was alert and orientated times 3  • Mood appropriate  • Thought appropriate    • I heard no respiratory distress over the phone today       SUBJECTIVE   78year old male referred to [facility] for pre-surgery geriatric screening 2.2 advanced age. Patient has been managing ongoing right knee pain and is electing to have a right knee replacement. He denies ever having any problems with anesthesia. I spoke to patient over the telephone. I was placed on speaker phone to discuss surgery instructions with him and his wife. Both were pleasant and a pleasure to care for. There were no cognitive concerns identified today. There were no geriatric concerns identified today. I did recommend an inpatient geriatric consult after surgery due to advanced age of 78, having surgery, and receiving anesthetics. This consult should not hold up discharge. Patient admits to being in well health today. He is active. His METS is 9. He denies chest pain & denies shortness of breath. He states "I am 78 going on 68". He goes to the gym 3 times a week. I encouraged him to keep doing so. Today we went over the difference options for anesthesia. At this time he is scheduled for spinal with nerve block. Patient does have some fear surrounding the spinal.  He will discuss with family and friends and then make his final choice. PAT's were reviewed as stable. He received medical clearance today. EKG is still pending but I do not anticipate any problems. METS is 9. He has a surgical nurse phone call scheduled for 8/7/2023. He will get his type and screen done closer to surgery. As always we started our best protocol    As always we discussed having your BEST surgery, and BEST recovery. Surgery goals reviewed today. Breathing exercises   Patient was encouraged to begin lung exercises today.   This could be accomplished through deep breathing and cough exercises. Eating/nutrition   Encouraged patient to increase oral protein intake prior to surgery. This can be accomplished by consuming chicken, fish, tuna fish, cottage cheese, cheese, eggs, [place] yogurt, and protein shakes as needed. I encouraged use of protein shakes such ENLIVE. I also recommended making your own protein shakes with protein powder. Sleep/Stress management  Patient was encouraged to rest their body prior to surgery. Encouraged attempting to get 8 hours of sleep at night. Avoid stress. Avoid sick contacts. Encouraged to find a relaxing hobby such as reading, meditation, listening to music. Training exercises  Patient was encouraged to remain active as possible. Today bilateral lower extremity generic exercises were taught for muscle strengthening and balance. All exercises to be done sitting down.         Visit Time  Total Visit Duration: 38 MINUTES

## 2023-08-07 NOTE — TELEPHONE ENCOUNTER
Preoperative Elective Admission Assessment    Living Situation:    Pt and pt's wife live together in multiple level home, #2 steps to enter to recovery level. First Floor Setup: Yes     DME: Pt has RW. Offered BSC, pt declined. Patient's Current Level of Function: ambulates independently, independent with ADLs. Post-op Caregiver and transportation: wife      Outpatient Physical Therapy Site:  Site: [facility]  pre and post-op appts scheduled? Y     Medication Management:self   Preferred Pharmacy for Post-op Medications: [id number] - [place], [place] - [address] [place] [address]  Blood Management Vitamin Regimen: pt is taking as RXd. Denies questions. Post-op anticoagulant: pt encouraged to  prior to surgery. He is aware the lovenox is for post-op use only. DC Plan:pt plans for DC to home. Barriers to DC identified preoperatively: none    BMI: 28.96    Pt IQ:  no outstanding tasks as of 8/7    Patient Education:  Pt educated on post-op pain, early mobilization (POD0), Average inpt LOS, OP PT goal.  Patient educated that our goal is to appropriately discharge patient based off their post-op function while striving to maintain maximal independence. The goal is to discharge patient to home and for them to attend outpatient physical therapy. Assigned to care team?y    CHW/SW referral:  N/A    Pt encouraged to call with any questions, concerns or issues. *Pt has questions on the spinal anesthesia Vs. General anesthesia. Sent via PP to anesthesia to please call pt to discuss.

## 2023-08-07 NOTE — PRE-PROCEDURE INSTRUCTIONS
Pre-Surgery Instructions:   Medication Instructions   • ascorbic acid (VITAMIN C) 500 MG tablet Hold day of surgery. • ferrous sulfate 324 (65 Fe) mg Hold day of surgery. • folic acid (FOLVITE) 1 mg tablet Hold day of surgery. • Multiple Vitamins-Minerals (MULTIVITAMIN WITH MINERALS) tablet Hold day of surgery. Medication instructions for day surgery reviewed. Please use only a sip of water to take your instructed medications. Avoid all over the counter vitamins, supplements and NSAIDS for one week prior to surgery per anesthesia guidelines. Tylenol is ok to take as needed. You will receive a call one business day prior to surgery with an arrival time and hospital directions. If your surgery is scheduled on a Monday, the hospital will be calling you on the Friday prior to your surgery. If you have not heard from anyone by 8pm, please call the hospital supervisor through the hospital  at [phone]. Hunter Lopez [phone]). Do not eat or drink anything after midnight the night before your surgery, including candy, mints, lifesavers, or chewing gum. Do not drink alcohol 24hrs before your surgery. Try not to smoke at least 24hrs before your surgery. Follow the pre surgery showering instructions as listed in the [facility] Surgical Experience Booklet” or otherwise provided by your surgeon's office. Do not shave the surgical area 24 hours before surgery. Do not apply any lotions, creams, including makeup, cologne, deodorant, or perfumes after showering on the day of your surgery. No contact lenses, eye make-up, or artificial eyelashes. Remove nail polish, including gel polish, and any artificial, gel, or acrylic nails if possible. Remove all jewelry including rings and body piercing jewelry. Wear causal clothing that is easy to take on and off. Consider your type of surgery. Keep any valuables, jewelry, piercings at home.  Please bring any specially ordered equipment (sling, braces) if indicated. Arrange for a responsible person to drive you to and from the hospital on the day of your surgery. Visitor Guidelines discussed. Call the surgeon's office with any new illnesses, exposures, or additional questions prior to surgery. Please reference your [facility] Surgical Experience Booklet” for additional information to prepare for your upcoming surgery. Spoke with pt and his wife. Pt instructed to stop nsaids one week prior to surgery. Per verbalized understanding of shower, med and CHG cloth instructions.

## 2023-08-14 ENCOUNTER — EVALUATION (OUTPATIENT)
Dept: PHYSICAL THERAPY | Facility: CLINIC | Age: 78
End: 2023-08-14
Payer: COMMERCIAL

## 2023-08-14 DIAGNOSIS — M17.11 PRIMARY OSTEOARTHRITIS OF RIGHT KNEE: ICD-10-CM

## 2023-08-14 PROCEDURE — 97110 THERAPEUTIC EXERCISES: CPT | Performed by: PHYSICAL THERAPIST

## 2023-08-14 PROCEDURE — 97161 PT EVAL LOW COMPLEX 20 MIN: CPT | Performed by: PHYSICAL THERAPIST

## 2023-08-14 NOTE — PROGRESS NOTES
PT Evaluation     Today's date: 2023  Patient name: Ivan Sexton  : 1945  MRN: [id number]  Referring provider: Larry Pollack  Dx:   Encounter Diagnosis     ICD-10-CM    1. Primary osteoarthritis of right knee  M17.11 Ambulatory referral to Physical Therapy                     Assessment  Assessment details: Patient is a 78 y.o. male who  presents with pain, range of motion loss, weakness associated with a diagnosis of R knee osteoarthritis. He has functional limitations as a result of impairments. Today provided the following services to patient in addition to P.T. evaluation. Gait Training with wheeled walker  Reviewed current and  immediate post-operative home exercise program  Virtual Home Assessment  Home Preparation Checklist was reviewed with patient including identification of care partner and encouragement of single level set-up  Post-operative pain management expectations  Discuss DOS and answered patient's questions    Impairments: abnormal or restricted ROM, activity intolerance, impaired physical strength, lacks appropriate home exercise program and pain with function  Understanding of Dx/Px/POC: excellent  Goals  ST: Pt to verbalize understanding of HEP and home preparation awaiting  TKA-met  LT: Pt to help maximize post op recovery through use of pre-op ROM, flexibility, and strenthening exercises. - will follow up post op. Plan  Patient would benefit from: skilled PT  Planned therapy interventions: home exercise program  Frequency: 1x week  Treatment plan discussed with: patient        Subjective Evaluation    History of Present Illness  Mechanism of injury: Patient is a 78 y.o. old male who presents for an initial outpatient physical therapy evaluation  regarding his R knee. PMH significant for R ankle ORIF. Reports insidious onset of R knee pain around 2023. X-rays (+) for severe lateral knee OA.    Steroidal injection via Dr. Claudell Greulich at that time, received a second injection about three weeks later. Neither injection helped much. Still with pervasive pain with ADLs, has decided to undergo  TKA, scheduled for 34. Is here for a pre-operative consultation. Biggest problem is going up and down stairs  Patient Goals  Patient goal: best prepare for upcoming TKA  Pain  Current pain ratin  At worst pain ratin  Aggravating factors: stair climbing, standing and walking    Social Support  Steps to enter house: yes (x2)  Lives in: multiple-level home (has one story step for exercise)  Lives with: spouse    Exercise history: spend about 1.5 hours of exercise 3 days a week. Objective     Static Posture     Knee   Genu valgus. Active Range of Motion   Left Knee   Flexion: 130 degrees   Extension: 0 degrees     Right Knee   Flexion: 120 degrees   Extension: -3 degrees     Right Ankle/Foot   Dorsiflexion (ke): WFL  Dorsiflexion (kf): WFL  Plantar flexion: WFL  Inversion: WFL    Passive Range of Motion     Right Knee   Flexion: 125 degrees   Extension: 0 degrees     Patellar Static Positioning   Left Knee: lateral tilt  Right Knee: lateral tilt    Strength/Myotome Testing     Right Hip   Planes of Motion   Flexion: 5  Extension: 4+  Abduction: 4+  Adduction: 5    Left Knee   Flexion: 5  Extension: 5  Quadriceps contraction: good    Right Knee   Flexion: 4+  Extension: 5  Quadriceps contraction: fair    Right Ankle/Foot   Dorsiflexion: 5  Plantar flexion: 5  Inversion: 5  Eversion: 5    Tests     Left Knee   Negative valgus stress test at 0 degrees, valgus stress test at 30 degrees, varus stress test at 0 degrees and varus stress test at 30 degrees. Right Knee   Negative valgus stress test at 0 degrees, varus stress test at 0 degrees and varus stress test at 30 degrees.      Ambulation   Weight-Bearing Status   Weight-Bearing Status (Left): full weight bearing   Weight-Bearing Status (Right): full weight-bearing    Assistive device used: none    Observational Gait     Additional Observational Gait Details  Lacks terminal extension on R             Precautions: none      Manuals 8/14/23                                                                Neuro Re-Ed                                                                                                        Ther Ex                                                                                                                     Ther Activity                                       Gait Training                                       Modalities                          IE/HEP RG

## 2023-08-21 ENCOUNTER — ANESTHESIA EVENT (OUTPATIENT)
Dept: PERIOP | Facility: HOSPITAL | Age: 78
End: 2023-08-21
Payer: COMMERCIAL

## 2023-08-22 DIAGNOSIS — Z96.651 AFTERCARE FOLLOWING RIGHT KNEE JOINT REPLACEMENT SURGERY: Primary | ICD-10-CM

## 2023-08-22 DIAGNOSIS — Z47.1 AFTERCARE FOLLOWING RIGHT KNEE JOINT REPLACEMENT SURGERY: Primary | ICD-10-CM

## 2023-08-24 ENCOUNTER — ANESTHESIA (OUTPATIENT)
Dept: PERIOP | Facility: HOSPITAL | Age: 78
End: 2023-08-24
Payer: COMMERCIAL

## 2023-08-24 ENCOUNTER — HOSPITAL ENCOUNTER (OUTPATIENT)
Facility: HOSPITAL | Age: 78
Setting detail: OUTPATIENT SURGERY
Discharge: HOME | End: 2023-08-25
Attending: ORTHOPAEDIC SURGERY | Admitting: ORTHOPAEDIC SURGERY
Payer: COMMERCIAL

## 2023-08-24 DIAGNOSIS — R73.01 IMPAIRED FASTING GLUCOSE: ICD-10-CM

## 2023-08-24 DIAGNOSIS — M17.11 PRIMARY OSTEOARTHRITIS OF RIGHT KNEE: Primary | ICD-10-CM

## 2023-08-24 DIAGNOSIS — I49.3 PVC'S (PREMATURE VENTRICULAR CONTRACTIONS): ICD-10-CM

## 2023-08-24 LAB
ANION GAP SERPL CALCULATED.3IONS-SCNC: 5 MMOL/L
BUN SERPL-MCNC: 31 MG/DL (ref 5–25)
CALCIUM SERPL-MCNC: 8.2 MG/DL (ref 8.4–10.2)
CHLORIDE SERPL-SCNC: 106 MMOL/L (ref 96–108)
CO2 SERPL-SCNC: 28 MMOL/L (ref 21–32)
CREAT SERPL-MCNC: 0.97 MG/DL (ref 0.6–1.3)
GFR SERPL CREATININE-BSD FRML MDRD: 73 ML/MIN/1.73SQ M
GLUCOSE SERPL-MCNC: 128 MG/DL (ref 65–140)
GLUCOSE SERPL-MCNC: 228 MG/DL (ref 65–140)
PLATELET # BLD AUTO: 193 THOUSANDS/UL (ref 149–390)
PMV BLD AUTO: 10 FL (ref 8.9–12.7)
POTASSIUM SERPL-SCNC: 3.8 MMOL/L (ref 3.5–5.3)
SODIUM SERPL-SCNC: 139 MMOL/L (ref 135–147)

## 2023-08-24 PROCEDURE — 97163 PT EVAL HIGH COMPLEX 45 MIN: CPT

## 2023-08-24 PROCEDURE — 85049 AUTOMATED PLATELET COUNT: CPT | Performed by: PHYSICIAN ASSISTANT

## 2023-08-24 PROCEDURE — 27447 TOTAL KNEE ARTHROPLASTY: CPT | Performed by: ORTHOPAEDIC SURGERY

## 2023-08-24 PROCEDURE — C1776 JOINT DEVICE (IMPLANTABLE): HCPCS | Performed by: ORTHOPAEDIC SURGERY

## 2023-08-24 PROCEDURE — NC001 PR NO CHARGE: Performed by: ORTHOPAEDIC SURGERY

## 2023-08-24 PROCEDURE — NC001 PR NO CHARGE: Performed by: PHYSICIAN ASSISTANT

## 2023-08-24 PROCEDURE — S2900 ROBOTIC SURGICAL SYSTEM: HCPCS | Performed by: ORTHOPAEDIC SURGERY

## 2023-08-24 PROCEDURE — C1713 ANCHOR/SCREW BN/BN,TIS/BN: HCPCS | Performed by: ORTHOPAEDIC SURGERY

## 2023-08-24 PROCEDURE — 99222 1ST HOSP IP/OBS MODERATE 55: CPT | Performed by: INTERNAL MEDICINE

## 2023-08-24 PROCEDURE — 99024 POSTOP FOLLOW-UP VISIT: CPT | Performed by: ORTHOPAEDIC SURGERY

## 2023-08-24 PROCEDURE — 80048 BASIC METABOLIC PNL TOTAL CA: CPT | Performed by: NURSE PRACTITIONER

## 2023-08-24 PROCEDURE — 82948 REAGENT STRIP/BLOOD GLUCOSE: CPT

## 2023-08-24 PROCEDURE — C9290 INJ, BUPIVACAINE LIPOSOME: HCPCS | Performed by: STUDENT IN AN ORGANIZED HEALTH CARE EDUCATION/TRAINING PROGRAM

## 2023-08-24 DEVICE — ATTUNE KNEE SYSTEM TIBIAL BASE FIXED BEARING SIZE 5 CEMENTED
Type: IMPLANTABLE DEVICE | Site: KNEE | Status: FUNCTIONAL
Brand: ATTUNE

## 2023-08-24 DEVICE — ATTUNE KNEE SYSTEM TIBIAL INSERT FIXED BEARING POSTERIOR STABILIZED 6 7MM AOX
Type: IMPLANTABLE DEVICE | Site: KNEE | Status: FUNCTIONAL
Brand: ATTUNE

## 2023-08-24 DEVICE — ATTUNE PATELLA MEDIALIZED DOME 35MM CEMENTED AOX
Type: IMPLANTABLE DEVICE | Site: KNEE | Status: FUNCTIONAL
Brand: ATTUNE

## 2023-08-24 DEVICE — ATTUNE KNEE SYSTEM FEMORAL POSTERIOR STABILIZED NARROW SIZE 6N RIGHT CEMENTED
Type: IMPLANTABLE DEVICE | Site: KNEE | Status: FUNCTIONAL
Brand: ATTUNE

## 2023-08-24 DEVICE — SMARTSET HV HIGH VISCOSITY BONE CEMENT 40G
Type: IMPLANTABLE DEVICE | Site: KNEE | Status: FUNCTIONAL
Brand: SMARTSET

## 2023-08-24 RX ORDER — OXYCODONE HYDROCHLORIDE 5 MG/1
5 TABLET ORAL EVERY 4 HOURS PRN
Qty: 30 TABLET | Refills: 0 | Status: SHIPPED | OUTPATIENT
Start: 2023-08-24 | End: 2023-08-31 | Stop reason: SDUPTHER

## 2023-08-24 RX ORDER — CEFAZOLIN SODIUM 2 G/50ML
2000 SOLUTION INTRAVENOUS ONCE
Status: COMPLETED | OUTPATIENT
Start: 2023-08-24 | End: 2023-08-24

## 2023-08-24 RX ORDER — FENTANYL CITRATE 50 UG/ML
INJECTION, SOLUTION INTRAMUSCULAR; INTRAVENOUS AS NEEDED
Status: DISCONTINUED | OUTPATIENT
Start: 2023-08-24 | End: 2023-08-24

## 2023-08-24 RX ORDER — HYDROMORPHONE HCL/PF 1 MG/ML
0.5 SYRINGE (ML) INJECTION EVERY 2 HOUR PRN
Status: DISCONTINUED | OUTPATIENT
Start: 2023-08-24 | End: 2023-08-25 | Stop reason: HOSPADM

## 2023-08-24 RX ORDER — ENOXAPARIN SODIUM 100 MG/ML
40 INJECTION SUBCUTANEOUS DAILY
Status: DISCONTINUED | OUTPATIENT
Start: 2023-08-25 | End: 2023-08-25 | Stop reason: HOSPADM

## 2023-08-24 RX ORDER — ONDANSETRON 2 MG/ML
4 INJECTION INTRAMUSCULAR; INTRAVENOUS EVERY 6 HOURS PRN
Status: DISCONTINUED | OUTPATIENT
Start: 2023-08-24 | End: 2023-08-25 | Stop reason: HOSPADM

## 2023-08-24 RX ORDER — OXYCODONE HYDROCHLORIDE 5 MG/1
5 TABLET ORAL EVERY 4 HOURS PRN
Status: DISCONTINUED | OUTPATIENT
Start: 2023-08-24 | End: 2023-08-25 | Stop reason: HOSPADM

## 2023-08-24 RX ORDER — CEFAZOLIN SODIUM 2 G/50ML
2000 SOLUTION INTRAVENOUS EVERY 8 HOURS
Status: DISCONTINUED | OUTPATIENT
Start: 2023-08-24 | End: 2023-08-24

## 2023-08-24 RX ORDER — GABAPENTIN 300 MG/1
300 CAPSULE ORAL ONCE
Status: COMPLETED | OUTPATIENT
Start: 2023-08-24 | End: 2023-08-24

## 2023-08-24 RX ORDER — PROPOFOL 10 MG/ML
INJECTION, EMULSION INTRAVENOUS CONTINUOUS PRN
Status: DISCONTINUED | OUTPATIENT
Start: 2023-08-24 | End: 2023-08-24

## 2023-08-24 RX ORDER — PROPOFOL 10 MG/ML
INJECTION, EMULSION INTRAVENOUS AS NEEDED
Status: DISCONTINUED | OUTPATIENT
Start: 2023-08-24 | End: 2023-08-24

## 2023-08-24 RX ORDER — MAGNESIUM HYDROXIDE 1200 MG/15ML
LIQUID ORAL AS NEEDED
Status: DISCONTINUED | OUTPATIENT
Start: 2023-08-24 | End: 2023-08-24 | Stop reason: HOSPADM

## 2023-08-24 RX ORDER — CHLORHEXIDINE GLUCONATE 0.12 MG/ML
15 RINSE ORAL ONCE
Status: COMPLETED | OUTPATIENT
Start: 2023-08-24 | End: 2023-08-24

## 2023-08-24 RX ORDER — CEFAZOLIN SODIUM 2 G/50ML
2000 SOLUTION INTRAVENOUS EVERY 8 HOURS
Status: COMPLETED | OUTPATIENT
Start: 2023-08-24 | End: 2023-08-25

## 2023-08-24 RX ORDER — SODIUM CHLORIDE, SODIUM LACTATE, POTASSIUM CHLORIDE, CALCIUM CHLORIDE 600; 310; 30; 20 MG/100ML; MG/100ML; MG/100ML; MG/100ML
INJECTION, SOLUTION INTRAVENOUS CONTINUOUS PRN
Status: DISCONTINUED | OUTPATIENT
Start: 2023-08-24 | End: 2023-08-24

## 2023-08-24 RX ORDER — ACETAMINOPHEN 325 MG/1
650 TABLET ORAL EVERY 8 HOURS SCHEDULED
Status: DISCONTINUED | OUTPATIENT
Start: 2023-08-24 | End: 2023-08-25 | Stop reason: HOSPADM

## 2023-08-24 RX ORDER — OXYCODONE HYDROCHLORIDE 5 MG/1
10 TABLET ORAL EVERY 4 HOURS PRN
Status: DISCONTINUED | OUTPATIENT
Start: 2023-08-24 | End: 2023-08-25 | Stop reason: HOSPADM

## 2023-08-24 RX ORDER — ACETAMINOPHEN 325 MG/1
975 TABLET ORAL ONCE
Status: COMPLETED | OUTPATIENT
Start: 2023-08-24 | End: 2023-08-24

## 2023-08-24 RX ORDER — ENOXAPARIN SODIUM 100 MG/ML
40 INJECTION SUBCUTANEOUS
Qty: 11.2 ML | Refills: 0 | Status: SHIPPED | OUTPATIENT
Start: 2023-08-24 | End: 2023-08-30

## 2023-08-24 RX ORDER — HYDRALAZINE HYDROCHLORIDE 25 MG/1
25 TABLET, FILM COATED ORAL EVERY 8 HOURS PRN
Status: DISCONTINUED | OUTPATIENT
Start: 2023-08-24 | End: 2023-08-25 | Stop reason: HOSPADM

## 2023-08-24 RX ORDER — BUPIVACAINE HYDROCHLORIDE 7.5 MG/ML
INJECTION, SOLUTION INTRASPINAL AS NEEDED
Status: DISCONTINUED | OUTPATIENT
Start: 2023-08-24 | End: 2023-08-24

## 2023-08-24 RX ORDER — BUPIVACAINE HYDROCHLORIDE 5 MG/ML
INJECTION, SOLUTION EPIDURAL; INTRACAUDAL
Status: COMPLETED | OUTPATIENT
Start: 2023-08-24 | End: 2023-08-24

## 2023-08-24 RX ORDER — SODIUM CHLORIDE, SODIUM LACTATE, POTASSIUM CHLORIDE, CALCIUM CHLORIDE 600; 310; 30; 20 MG/100ML; MG/100ML; MG/100ML; MG/100ML
125 INJECTION, SOLUTION INTRAVENOUS CONTINUOUS
Status: DISCONTINUED | OUTPATIENT
Start: 2023-08-24 | End: 2023-08-25 | Stop reason: HOSPADM

## 2023-08-24 RX ORDER — ONDANSETRON 2 MG/ML
4 INJECTION INTRAMUSCULAR; INTRAVENOUS ONCE AS NEEDED
Status: DISCONTINUED | OUTPATIENT
Start: 2023-08-24 | End: 2023-08-24 | Stop reason: HOSPADM

## 2023-08-24 RX ORDER — SENNOSIDES 8.6 MG
1 TABLET ORAL DAILY
Status: DISCONTINUED | OUTPATIENT
Start: 2023-08-24 | End: 2023-08-25 | Stop reason: HOSPADM

## 2023-08-24 RX ORDER — TRANEXAMIC ACID 10 MG/ML
1000 INJECTION, SOLUTION INTRAVENOUS ONCE
Status: COMPLETED | OUTPATIENT
Start: 2023-08-24 | End: 2023-08-24

## 2023-08-24 RX ORDER — BUPIVACAINE HYDROCHLORIDE 2.5 MG/ML
INJECTION, SOLUTION EPIDURAL; INFILTRATION; INTRACAUDAL
Status: COMPLETED | OUTPATIENT
Start: 2023-08-24 | End: 2023-08-24

## 2023-08-24 RX ADMIN — BUPIVACAINE HYDROCHLORIDE 10 ML: 2.5 INJECTION, SOLUTION EPIDURAL; INFILTRATION; INTRACAUDAL at 09:22

## 2023-08-24 RX ADMIN — PROPOFOL 80 MCG/KG/MIN: 10 INJECTION, EMULSION INTRAVENOUS at 09:59

## 2023-08-24 RX ADMIN — SENNOSIDES 8.6 MG: 8.6 TABLET, FILM COATED ORAL at 14:37

## 2023-08-24 RX ADMIN — CEFAZOLIN SODIUM 2000 MG: 2 SOLUTION INTRAVENOUS at 10:00

## 2023-08-24 RX ADMIN — SODIUM CHLORIDE, SODIUM LACTATE, POTASSIUM CHLORIDE, AND CALCIUM CHLORIDE 125 ML/HR: .6; .31; .03; .02 INJECTION, SOLUTION INTRAVENOUS at 14:39

## 2023-08-24 RX ADMIN — ACETAMINOPHEN 975 MG: 325 TABLET, FILM COATED ORAL at 07:50

## 2023-08-24 RX ADMIN — BUPIVACAINE HYDROCHLORIDE 5 ML: 5 INJECTION, SOLUTION EPIDURAL; INTRACAUDAL; PERINEURAL at 09:27

## 2023-08-24 RX ADMIN — GABAPENTIN 300 MG: 300 CAPSULE ORAL at 07:50

## 2023-08-24 RX ADMIN — SODIUM CHLORIDE, SODIUM LACTATE, POTASSIUM CHLORIDE, AND CALCIUM CHLORIDE 125 ML/HR: .6; .31; .03; .02 INJECTION, SOLUTION INTRAVENOUS at 07:50

## 2023-08-24 RX ADMIN — FENTANYL CITRATE 50 MCG: 50 INJECTION, SOLUTION INTRAMUSCULAR; INTRAVENOUS at 09:18

## 2023-08-24 RX ADMIN — TRANEXAMIC ACID 1000 MG: 10 INJECTION, SOLUTION INTRAVENOUS at 10:05

## 2023-08-24 RX ADMIN — CEFAZOLIN SODIUM 2000 MG: 2 SOLUTION INTRAVENOUS at 20:32

## 2023-08-24 RX ADMIN — OXYCODONE HYDROCHLORIDE 10 MG: 5 TABLET ORAL at 14:37

## 2023-08-24 RX ADMIN — CHLORHEXIDINE GLUCONATE 15 ML: 1.2 SOLUTION ORAL at 07:50

## 2023-08-24 RX ADMIN — BUPIVACAINE HYDROCHLORIDE IN DEXTROSE 1.6 ML: 7.5 INJECTION, SOLUTION SUBARACHNOID at 09:56

## 2023-08-24 RX ADMIN — PROPOFOL 40 MG: 10 INJECTION, EMULSION INTRAVENOUS at 09:59

## 2023-08-24 RX ADMIN — BUPIVACAINE 10 ML: 13.3 INJECTION, SUSPENSION, LIPOSOMAL INFILTRATION at 09:22

## 2023-08-24 RX ADMIN — PHENYLEPHRINE HYDROCHLORIDE 40 MCG/MIN: 10 INJECTION INTRAVENOUS at 09:59

## 2023-08-24 RX ADMIN — FENTANYL CITRATE 50 MCG: 50 INJECTION, SOLUTION INTRAMUSCULAR; INTRAVENOUS at 10:04

## 2023-08-24 RX ADMIN — SODIUM CHLORIDE, SODIUM LACTATE, POTASSIUM CHLORIDE, AND CALCIUM CHLORIDE 125 ML/HR: .6; .31; .03; .02 INJECTION, SOLUTION INTRAVENOUS at 12:58

## 2023-08-24 RX ADMIN — OXYCODONE HYDROCHLORIDE 10 MG: 5 TABLET ORAL at 20:28

## 2023-08-24 RX ADMIN — BUPIVACAINE 10 ML: 13.3 INJECTION, SUSPENSION, LIPOSOMAL INFILTRATION at 09:27

## 2023-08-24 RX ADMIN — ACETAMINOPHEN 650 MG: 325 TABLET, FILM COATED ORAL at 14:37

## 2023-08-24 RX ADMIN — SODIUM CHLORIDE, SODIUM LACTATE, POTASSIUM CHLORIDE, AND CALCIUM CHLORIDE: .6; .31; .03; .02 INJECTION, SOLUTION INTRAVENOUS at 09:23

## 2023-08-24 RX ADMIN — SODIUM CHLORIDE, SODIUM LACTATE, POTASSIUM CHLORIDE, AND CALCIUM CHLORIDE 125 ML/HR: .6; .31; .03; .02 INJECTION, SOLUTION INTRAVENOUS at 17:46

## 2023-08-24 NOTE — H&P (VIEW-ONLY)
Office Visit    7/25/2023  [address], [place]  Orthopedic Surgery  Chronic pain of right knee +3 more  Dx  Right Knee - Follow-up  Reason for Visit     Progress Notes  Siddhartha Peck MD (Physician) • • Orthopedic Surgery  Expand All Collapse All  Assessment:  1. Chronic pain of right knee          2. Primary osteoarthritis of right knee          3. Encounter for other orthopedic aftercare          4. Encounter for other specified surgical aftercare                Plan:  Right knee osteoarthritis. The patient will be scheduled for right total knee arthroplasty. We feel the patient will find significant relief per current symptoms, findings on imaging and exam.  Risks, benefits, precautions and expectations were discussed. Risks include blood loss, potential infection and blood clots. Preoperative vitamins were prescribed. The patient should follow up after surgery.          To do next visit:  Return for post-op with x-rays.     The above stated was discussed in layman's terms and the patient expressed understanding. All questions were answered to the patient's satisfaction.              Scribe Attestation    I,:  Lupe Stearns am acting as a scribe while in the presence of the attending physician.:       I,:  Siddhartha Peck MD personally performed the services described in this documentation    as scribed in my presence. :                 Subjective:   Blayne Julio is a 78 y.o. male who presents for follow up of right knee. He is s/p right knee steroid injection with one day of relief, 6/26/2023. Today he complains of severe right anterolateral and generalized knee pain. Prolonged weight bearing and repetitive bending aggravates while rest alleviates. His pain effects his daily activity and sleep.   He has used oral medications, tried activity modification and steroid injection with limited benefit.          Review of systems negative unless otherwise specified in HPI     Medical History        Past Medical History:   Diagnosis Date   • Colon polyp     • Dermatitis     • Prostate CA ([address])     • Rotator cuff injury       left   • Syncope       6-7 years ago and 10-15 years ago.            Surgical History         Past Surgical History:   Procedure Laterality Date   • ANKLE FRACTURE SURGERY       • CHOLECYSTECTOMY       • COLONOSCOPY       • PROSTATE CANCER GENE 3(PCA3) (HISTORICAL)   04/2019      with radiation            Family History         Family History   Problem Relation Age of Onset   • Colon cancer Mother     • Diabetes Father              Social History            Occupational History   • Not on file   Tobacco Use   • Smoking status: Former       Packs/day: 0.25       Years: 1.00       Total pack years: 0.25       Types: Cigarettes   • Smokeless tobacco: Never   Vaping Use   • Vaping Use: Never used   Substance and Sexual Activity   • Alcohol use: Yes       Comment: Social   • Drug use: No   • Sexual activity: Not on file            Current Outpatient Medications:   •  Multiple Vitamins-Minerals (MULTIVITAMIN WITH MINERALS) tablet, Take 1 tablet by mouth daily. , Disp: , Rfl:   •  multivitamin (THERAGRAN) TABS, Take 1 tablet by mouth daily, Disp: , Rfl:      No Known Allergies               Vitals:     07/25/23 1056   BP: 161/84   Pulse: 76         Objective:  Physical exam  • General: Awake, Alert, Oriented  • Eyes: Pupils equal, round and reactive to light  • Heart: regular rate and rhythm  • Lungs: No audible wheezing  • Abdomen: soft                     Ortho Exam  Right knee:  Valgus alignment  No erythema or ecchymosis  No effusion or swelling  Normal strength  Good ROM with crepitus   Calf compartments soft and supple  Sensation intact  Toes are warm sensate and mobile        Diagnostics, reviewed and taken today if performed as documented:      The attending physician has personally reviewed the pertinent films in PACS and interpretation is as follows:  Right knee x-ray of 5/17/2023:  Bone on bone lateral compartment apposition and moderate-severe patellofemoral arthritic changes with decreased joint space, subchondral sclerosis and osteophyte formation.     Procedures, if performed today:     Procedures     None performed       Portions of the record may have been created with voice recognition software.  Occasional wrong word or "sound a like" substitutions may have occurred due to the inherent limitations of voice recognition software.  Read the chart carefully and recognize, using context, where substitutions have occurred.           Instructions       Return for post-op with x-rays. After Visit Summary (Automatic SnapShot taken 7/25/2023)  Additional Documentation    Vitals:   /84   Pulse 76   Ht 5' 5" (1.651 m)   Wt 78.9 kg (174 lb)   BMI 28.96 kg/m²   BSA 1.86 m²   SmartForms:    [facility] PRE-CHARTING •    [facility] PCMH/PCSP WRAP UP REQUIREMENTS ADVANCED •    [facility] SCRIBE ATTESTATION   . ..(2 more)   Encounter Info:   Billing Info,   History,   Allergies,   Detailed Report        Orders Placed     Hemoglobin A1C W/EAG Estimation   Type and screen   Ambulatory referral to surgical optimization Pending Review   Ambulatory referral to Physical Therapy Authorized   Case request operating room: ARTHROPLASTY KNEE TOTAL W ROBOT Once  Medication Changes       Ascorbic Acid 500 mg Oral Daily, Start 30 days prior to surgery     Enoxaparin Sodium 40 mg Subcutaneous Daily, Start injections after surgery     Ferrous Sulfate 324 mg Oral 2 times daily before meals, Start 30 days prior to surgery     Folic Acid 1 mg Oral Daily, Start 30 days prior to surgery  Medication List  Visit Diagnoses       Chronic pain of right knee     Primary osteoarthritis of right knee     Encounter for other orthopedic aftercare     Encounter for other specified surgical aftercare  Problem List

## 2023-08-24 NOTE — CONSULTS
Office Visit    7/25/2023  [address], MD  Orthopedic Surgery  Chronic pain of right knee +3 more  Dx  Right Knee - Follow-up  Reason for Visit     Progress Notes  Deep Feliz MD (Physician) • • Orthopedic Surgery  Expand All Collapse All  Assessment:  1. Chronic pain of right knee          2. Primary osteoarthritis of right knee          3. Encounter for other orthopedic aftercare          4. Encounter for other specified surgical aftercare                Plan:  Right knee osteoarthritis. The patient will be scheduled for right total knee arthroplasty. We feel the patient will find significant relief per current symptoms, findings on imaging and exam.  Risks, benefits, precautions and expectations were discussed. Risks include blood loss, potential infection and blood clots. Preoperative vitamins were prescribed. The patient should follow up after surgery.          To do next visit:  Return for post-op with x-rays.     The above stated was discussed in layman's terms and the patient expressed understanding. All questions were answered to the patient's satisfaction.              Scribe Attestation    I,:  Michelle Pina am acting as a scribe while in the presence of the attending physician.:       I,:  Deep Feliz MD personally performed the services described in this documentation    as scribed in my presence. :                 Subjective:   Juanita Rodriguez is a 78 y.o. male who presents for follow up of right knee. He is s/p right knee steroid injection with one day of relief, 6/26/2023. Today he complains of severe right anterolateral and generalized knee pain. Prolonged weight bearing and repetitive bending aggravates while rest alleviates. His pain effects his daily activity and sleep.   He has used oral medications, tried activity modification and steroid injection with limited benefit.          Review of systems negative unless otherwise specified in HPI     Medical History        Past Medical History:   Diagnosis Date   • Colon polyp     • Dermatitis     • Prostate CA ([address])     • Rotator cuff injury       left   • Syncope       6-7 years ago and 10-15 years ago.            Surgical History         Past Surgical History:   Procedure Laterality Date   • ANKLE FRACTURE SURGERY       • CHOLECYSTECTOMY       • COLONOSCOPY       • PROSTATE CANCER GENE 3(PCA3) (HISTORICAL)   04/2019      with radiation            Family History         Family History   Problem Relation Age of Onset   • Colon cancer Mother     • Diabetes Father              Social History            Occupational History   • Not on file   Tobacco Use   • Smoking status: Former       Packs/day: 0.25       Years: 1.00       Total pack years: 0.25       Types: Cigarettes   • Smokeless tobacco: Never   Vaping Use   • Vaping Use: Never used   Substance and Sexual Activity   • Alcohol use: Yes       Comment: Social   • Drug use: No   • Sexual activity: Not on file            Current Outpatient Medications:   •  Multiple Vitamins-Minerals (MULTIVITAMIN WITH MINERALS) tablet, Take 1 tablet by mouth daily. , Disp: , Rfl:   •  multivitamin (THERAGRAN) TABS, Take 1 tablet by mouth daily, Disp: , Rfl:      No Known Allergies               Vitals:     07/25/23 1056   BP: 161/84   Pulse: 76         Objective:  Physical exam  • General: Awake, Alert, Oriented  • Eyes: Pupils equal, round and reactive to light  • Heart: regular rate and rhythm  • Lungs: No audible wheezing  • Abdomen: soft                     Ortho Exam  Right knee:  Valgus alignment  No erythema or ecchymosis  No effusion or swelling  Normal strength  Good ROM with crepitus   Calf compartments soft and supple  Sensation intact  Toes are warm sensate and mobile        Diagnostics, reviewed and taken today if performed as documented:      The attending physician has personally reviewed the pertinent films in PACS and interpretation is as follows:  Right knee x-ray of 5/17/2023:  Bone on bone lateral compartment apposition and moderate-severe patellofemoral arthritic changes with decreased joint space, subchondral sclerosis and osteophyte formation.     Procedures, if performed today:     Procedures     None performed       Portions of the record may have been created with voice recognition software.  Occasional wrong word or "sound a like" substitutions may have occurred due to the inherent limitations of voice recognition software.  Read the chart carefully and recognize, using context, where substitutions have occurred.           Instructions       Return for post-op with x-rays. After Visit Summary (Automatic SnapShot taken 7/25/2023)  Additional Documentation    Vitals:   /84   Pulse 76   Ht 5' 5" (1.651 m)   Wt 78.9 kg (174 lb)   BMI 28.96 kg/m²   BSA 1.86 m²   SmartForms:    [facility] PRE-CHARTING •    [facility] PCMH/PCSP WRAP UP REQUIREMENTS ADVANCED •    [facility] SCRIBE ATTESTATION   . ..(2 more)   Encounter Info:   Billing Info,   History,   Allergies,   Detailed Report        Orders Placed     Hemoglobin A1C W/EAG Estimation   Type and screen   Ambulatory referral to surgical optimization Pending Review   Ambulatory referral to Physical Therapy Authorized   Case request operating room: ARTHROPLASTY KNEE TOTAL W ROBOT Once  Medication Changes       Ascorbic Acid 500 mg Oral Daily, Start 30 days prior to surgery     Enoxaparin Sodium 40 mg Subcutaneous Daily, Start injections after surgery     Ferrous Sulfate 324 mg Oral 2 times daily before meals, Start 30 days prior to surgery     Folic Acid 1 mg Oral Daily, Start 30 days prior to surgery  Medication List  Visit Diagnoses       Chronic pain of right knee     Primary osteoarthritis of right knee     Encounter for other orthopedic aftercare     Encounter for other specified surgical aftercare  Problem List

## 2023-08-24 NOTE — ANESTHESIA PROCEDURE NOTES
Peripheral Block    Patient location during procedure: holding area  Start time: 8/24/2023 9:27 AM  Reason for block: at surgeon's request and post-op pain management  Staffing  Performed by: Elvi Carney MD  Authorized by: Elvi Carney MD    Preanesthetic Checklist  Completed: patient identified, IV checked, site marked, risks and benefits discussed, surgical consent, monitors and equipment checked, pre-op evaluation and timeout performed  Peripheral Block  Patient position: supine  Prep: ChloraPrep  Patient monitoring: heart rate, continuous pulse ox and frequent blood pressure checks  Block type: Adductor Canal  Laterality: right  Injection technique: single-shot  Procedures: ultrasound guided, Ultrasound guidance required for the procedure to increase accuracy and safety of medication placement and decrease risk of complications.   Ultrasound permanent image savedbupivacaine (PF) (MARCAINE) 0.5 % injection 20 mL - Perineural   5 mL - 8/24/2023 9:27:00 AM  Needle  Needle type: Stimuplex   Needle gauge: 22 G  Needle length: 4 in  Needle localization: ultrasound guidance  Assessment  Injection assessment: incremental injection, local visualized surrounding nerve on ultrasound, negative aspiration for heme, no paresthesia on injection, frequent aspiration, needle tip visualized at all times, negative aspiration, no symptoms of intraneural/intravenous injection, negative for heart rate change and injected with ease  Paresthesia pain: none  Post-procedure:  site cleaned  patient tolerated the procedure well with no immediate complications  Additional Notes  10 cc exparel

## 2023-08-24 NOTE — ANESTHESIA POSTPROCEDURE EVALUATION
Post-Op Assessment Note    CV Status:  Stable  Pain Score: 0    Pain management: adequate     Mental Status:  Alert and awake   Hydration Status:  Euvolemic   PONV Controlled:  Controlled   Airway Patency:  Patent      Post Op Vitals Reviewed: Yes      Staff: CRNA, Anesthesiologist         There were no known notable events for this encounter.     BP   125/73   Temp   97.4   Pulse  62   Resp   14   SpO2   97

## 2023-08-24 NOTE — CONSULTS
Internal Medicine  Consultation Note    Patient: Poncho Dawson  Age/sex: 78 y.o. male  Medical Record #: [id number]    Assessment/Plan    Status Post right Total KNEE ARTHROPLASTY  • Continue post op pain control measures as prescribed. • Follow bowel regimen to help decrease narcotic induced constipation. •  Follow post operative hemoglobin with serial CBC and treat accordingly. • Monitor WBC and fever curve post op while encouraging use of incentive spirometer. • DVT prophylaxis in place and reviewed. CKD  • Level 3  • Baseline 0.9-1.2  • Avoid nephrotoxic medications  • Avoid hypotension in the post operative setting  • Monitor bmp    Impaired fasting glucose  • Hgb A1c 6.0  • Recommend following DM diet  • Monitor FBS    H/o Prostate CA  · Monitor for post operative retention            PRE-OP HGB LEVEL: 13.9    Subjective/ HPI: Poncho Dawson was seen and examined. Hx of KNEE pain failed out patient conservative measures. Elected to undergo total KNEE arthroplasty We are asked to see patient for post op management of underlying medical co-morbidities as outlined above. Pt did well intra and post operatively with good hemodynamics. Pt currently comfortable and without any reported post op nausea. ROS:   A 10 point ROS was performed; negative except as noted above.      Social History:    Substance Use History:   Social History     Substance and Sexual Activity   Alcohol Use Yes   • Alcohol/week: 7.0 standard drinks of alcohol   • Types: 7 Glasses of wine per week     Social History     Tobacco Use   Smoking Status Former   • Packs/day: 0.25   • Years: 1.00   • Total pack years: 0.25   • Types: Cigarettes   Smokeless Tobacco Never   Tobacco Comments    Quit smoking in 1970     Social History     Substance and Sexual Activity   Drug Use Never       Family History:    Family History   Problem Relation Age of Onset   • Colon cancer Mother    • Diabetes Father          Review of Scheduled Meds:  Current Facility-Administered Medications   Medication Dose Route Frequency Provider Last Rate   • acetaminophen  650 mg Oral Q8H [address] Denis Arechiga PA-C     • cefazolin  2,000 mg Intravenous Q8H Denis Arechiga PA-C     • [START ON 2023] enoxaparin  40 mg Subcutaneous Daily Gerardomeghna Mixon PA-C     • HYDROmorphone  0.5 mg Intravenous Q2H PRN Gerardo upe PA-C     • lactated ringers  1,000 mL Intravenous Once PRN Gerardo Dwaynee PA-C      And   • lactated ringers  1,000 mL Intravenous Once PRN Gerardo Dwaynee PA-C     • lactated ringers  125 mL/hr Intravenous Continuous Gerardo SaupSANKET case-C 125 mL/hr (23 1258)   • ondansetron  4 mg Intravenous Q6H PRN Gerardo Saupe, PA-C     • oxyCODONE  10 mg Oral Q4H PRN Gerardo Saupe, PA-C     • oxyCODONE  5 mg Oral Q4H PRN Gerardo Saupe, PA-C     • senna  1 tablet Oral Daily Denis Arechiga PA-C     • sodium chloride  1,000 mL Intravenous Once PRN Gerardo SANKET Mixon-C      And   • sodium chloride  1,000 mL Intravenous Once PRN Gerardo Saupe, PA-C         Labs: Invalid input(s): "LABGLOM", "CMP"               Results from last 7 days   Lab Units 23  1130   POC GLUCOSE mg/dl 128       Lab Results   Component Value Date    URINECX 20,000-29,000 cfu/ml Mixed Contaminants X3 2016       Input and Output Summary (last 24 hours):        Intake/Output Summary (Last 24 hours) at 2023 1421  Last data filed at 2023 1118  Gross per 24 hour   Intake 1000 ml   Output 300 ml   Net 700 ml       Imaging:     No orders to display       *Labs /Radiology studiesLabs reviewed  *Medications reviewed and reconciled as needed  *Please refer to order section for additional ordered labs studies  *Case discussed with primary attending during morning huddle case rounds    Vitals:   Temp (24hrs), Av.6 °F (36.4 °C), Min:97.4 °F (36.3 °C), Max:97.6 °F (36.4 °C)    Temp:  [97.4 °F (36.3 °C)-97.6 °F (36.4 °C)] 97.6 °F (36.4 °C)  HR:  [54-69] 69  Resp:  [14-17] 17  BP: ()/(49-77) 150/63  SpO2:  [95 %-100 %] 99 %  Body mass index is 28.96 kg/m². Physical Exam:   General Appearance: no distress, conversive  HEENT: PERRLA, conjuctiva normal; oropharynx clear; mucous membranes moist;   Neck:  Supple, no lymphadenopathy or thyromegaly  Lungs: CTA, normal respiratory effort, no retractions, expiratory effort normal  CV: regular rate and rhythm , PMI normal   ABD: soft non tender, no masses , no hepatic or splenomegaly  EXT: DP pulses intact, no lymphadenopathy, no edema ;  right KNEE dressing in place  Skin: normal turgor, normal texture, no rash  Psych: affect normal, mood normal  Neuro: AAOx3          Invasive Devices     Peripheral Intravenous Line  Duration           Peripheral IV 08/24/23 Distal;Dorsal (posterior); Left Forearm <1 day          Drain  Duration           Closed/Suction Drain Anterior;Right Knee Accordion 10 Fr. <1 day    Urethral Catheter Latex;Straight-tip 16 Fr. <1 day                   Code Status: Prior  Current Length of Stay: 0 day(s)    Total floor / unit time spent today 45 minutes  Coordination of patient's care was performed in conjunction with primary service. Time invested included review of patient's labs, vitals, and management of their comorbidities with continued monitoring, examination of patient as well as answering patient questions, documenting her findings and creating progress note in electronic medical record,  ordering appropriate diagnostic testing. Medical decision making for the day was made by supervising physician unless otherwise noted in their attestation statement. ** Please Note: Fluency Direct voice to text software may have been used in the creation of this document.  Audio transcription errors may occur**

## 2023-08-24 NOTE — PHYSICAL THERAPY NOTE
Physical Therapy Evaluation     Patient's Name: Orlin Cardoso    Admitting Diagnosis  Primary osteoarthritis of right knee [M17.11]    Problem List  Patient Active Problem List   Diagnosis    Syncope    PVC's (premature ventricular contractions)    Closed head injury    Contusion of jaw    Rotator cuff strain, left, initial encounter    Prostate CA ([address])    Erectile dysfunction    History of colonic polyps    Impaired fasting glucose    Mixed hyperlipidemia    Encounter for geriatric assessment    Primary osteoarthritis of right knee       Past Medical History  Past Medical History:   Diagnosis Date    Colon polyp     Dermatitis     Prostate CA ([address])     Rotator cuff injury     left    Syncope     6-7 years ago and 10-15 years ago. Past Surgical History  Past Surgical History:   Procedure Laterality Date    ANKLE FRACTURE SURGERY      CHOLECYSTECTOMY      COLONOSCOPY      PROSTATE CANCER GENE 3(PCA3) (HISTORICAL)  04/2019     with radiation        08/24/23 1525   PT Last Visit   PT Visit Date 08/24/23   Note Type   Note type Evaluation   Pain Assessment   Pain Assessment Tool 0-10   Pain Score 3   Pain Location/Orientation Orientation: Right;Location: Leg   Hospital Pain Intervention(s) Ambulation/increased activity;Repositioned;Cold applied   Restrictions/Precautions   Weight Bearing Precautions Per Order Yes   RLE Weight Bearing Per Order WBAT   Braces or Orthoses   (none)   Other Precautions Pain; Fall Risk;Telemetry;Multiple lines;WBS   Home Living   Type of [address] [place] [address] Two level; Able to live on main level with bedroom/bathroom; Performs ADLs on one level;Stairs to enter with rails  (2 curb steps)   Bathroom Shower/Tub Walk-in shower  (and tub shower; both on first floor)   Memoir chair   [address]   Additional Comments Prior to this admission patient resided with his spouse in 2 level home (resides on 1st floor with bedroom and 2 full bathrooms; 2 curb steps to enter). At his baseline patient is I with mobility (no use of AD), ADLs, and iADLS. + . Prior Function   Level of [place] Independent with ADLs; Independent with functional mobility; Independent with IADLS   Lives With Spouse   Receives Help From Family   IADLs Independent with driving; Independent with meal prep; Independent with medication management   Falls in the last 6 months 0   Vocational Retired   General   Additional Pertinent History 78 y.o. male admitted to [address] [place] [address] on 8/24/2023 for the following planned procedure: R TKA. Post-op patient is WBAT R LE. Family/Caregiver Present Yes  (spouse)   Cognition   Overall Cognitive Status WFL   Orientation Level Oriented X4   Subjective   Subjective "I like to keep going"   RUE Assessment   RUE Assessment WFL   LUE Assessment   LUE Assessment WFL   RLE Assessment   RLE Assessment X  (3-/5 grossly post-op)   LLE Assessment   LLE Assessment WFL   Bed Mobility   Supine to Sit 4  Minimal assistance   Additional items Assist x 1; Increased time required; Bedrails;LE management   Sit to Supine Unable to assess   Additional Comments post eval patient OOB in chair w/ LE elevated   Transfers   Sit to Stand 4  Minimal assistance   Additional items Assist x 1; Increased time required;Verbal cues   Stand to Sit 4  Minimal assistance   Additional items Assist x 1; Increased time required;Verbal cues   Additional Comments w/ RW   Ambulation/Elevation   Gait pattern Excessively slow; Short stride; Improper Weight shift; Antalgic;Decreased foot clearance   Gait Assistance 4  Minimal assist   Additional items Assist x 1;Verbal cues   Assistive Device Rolling walker   Distance 15 feet   Balance   Static Sitting Good   Static Standing Fair -   Ambulatory Poor +   Endurance Deficit   Endurance Deficit Yes   Endurance Deficit Description pain post-op, decreased ROM   Activity Tolerance   Activity Tolerance Patient tolerated treatment well   Medical Staff Made Aware Linette DPT   Nurse Made Aware channing to see per RN Ricky Khan and f/u post   Assessment   Prognosis Good   Problem List Decreased strength;Decreased range of motion; Impaired balance;Decreased mobility;Pain   Assessment PT completed evaluation of 78 y.o. male admitted to [facility] on 8/24/2023 for the following planned procedure: R TKA. Post-op patient is WBAT R LE. Patient's current status instabilities include ongoing pain, continuous O2/HR monitoring, surgical drain, falls risk, and regression in function from baseline. PMH is significant for R shoulder rotator cuff dysfunction, PVC, prostate cancer, and arthritis. Prior to this admission patient resided with his spouse in 2 level home (resides on 1st floor with bedroom and 2 full bathrooms; 2 curb steps to enter). At his baseline patient is I with mobility (no use of AD), ADLs, and iADLS. + . Patient presents at time of PT evaluation functioning below baseline and currently w/ overall mobility deficits 2* to: decreased strength and ROM of R LE, decreased balance, and gait deviations. During PT evaluation, patient currently is requiring min-AX1 for bed mobility, transfers, and ambulation. With use of RW patient walked 15 feet with step to gait pattern. PT d/c recommendation is for OPPT. Patient will continue to benefit from continued skilled PT this admission to achieve maximal function and safety.    Goals   Patient Goals to go home tomorrow   LT Expiration Date 08/31/23   Long Term Goal #1 1) Perform bed mobility mod-I to participate in frequent repositioning and improve skin integrity; 2) Perform functional transfers mod-I to promote I with toileting and OOB mobility; 3) Ambulate 150 feet mod-I with RW to participate in household and community level mobility; 4) Improve R LE strength by 1/2 grade in order to improve efficiency of tranfers; ;5) Navigate 2 curb steps S level in order to safely in/out of home   PT Treatment Day 0   Plan   Treatment/Interventions Functional transfer training;LE strengthening/ROM; Elevations; Therapeutic exercise;Patient/family training;Equipment eval/education; Bed mobility;Gait training;OT;Spoke to nursing   PT Frequency Twice a day   Recommendation   PT Discharge Recommendation Home with outpatient rehabilitation   Equipment Recommended Walker  (has RW)   AM-PAC Basic Mobility Inpatient   Turning in Flat Bed Without Bedrails 3   Lying on Back to Sitting on Edge of Flat Bed Without Bedrails 3   Moving Bed to Chair 3   Standing Up From Chair Using Arms 3   Walk in Room 3   Climb 3-5 Stairs With Railing 2   Basic Mobility Inpatient Raw Score 17   Basic Mobility Standardized Score 39.67   Highest Level Of Mobility   JH-HLM Goal 5: Stand one or more mins   JH-HLM Achieved 6: Walk 10 steps or more     The patient's AM-PAC Basic Mobility Inpatient Standardized Score is less than 42.9, suggesting this patient may benefit from discharge to post-acute rehabilitation services. Please also refer to the recommendation of the Physical Therapist for safe discharge planning.       Bernadette Dubois, PT, DPT

## 2023-08-24 NOTE — PLAN OF CARE
Problem: PHYSICAL THERAPY ADULT  Goal: Performs mobility at highest level of function for planned discharge setting. See evaluation for individualized goals. Description: Treatment/Interventions: Functional transfer training, LE strengthening/ROM, Elevations, Therapeutic exercise, Patient/family training, Equipment eval/education, Bed mobility, Gait training, OT, Spoke to nursing  Equipment Recommended: Louis Kinney (has RW)       See flowsheet documentation for full assessment, interventions and recommendations. Note: Prognosis: Good  Problem List: Decreased strength, Decreased range of motion, Impaired balance, Decreased mobility, Pain  Assessment: PT completed evaluation of 78 y.o. male admitted to [address] [place] [address] on 8/24/2023 for the following planned procedure: R TKA. Post-op patient is WBAT R LE. Patient's current status instabilities include ongoing pain, continuous O2/HR monitoring, surgical drain, falls risk, and regression in function from baseline. PMH is significant for R shoulder rotator cuff dysfunction, PVC, prostate cancer, and arthritis. Prior to this admission patient resided with his spouse in 2 level home (resides on 1st floor with bedroom and 2 full bathrooms; 2 curb steps to enter). At his baseline patient is I with mobility (no use of AD), ADLs, and iADLS. + . Patient presents at time of PT evaluation functioning below baseline and currently w/ overall mobility deficits 2* to: decreased strength and ROM of R LE, decreased balance, and gait deviations. During PT evaluation, patient currently is requiring min-AX1 for bed mobility, transfers, and ambulation. With use of RW patient walked 15 feet with step to gait pattern. PT d/c recommendation is for OPPT. Patient will continue to benefit from continued skilled PT this admission to achieve maximal function and safety.         PT Discharge Recommendation: Home with outpatient rehabilitation    See flowsheet documentation for full assessment.

## 2023-08-24 NOTE — PROGRESS NOTES
Progress Note - Orthopedics   Shraddha Knox 78 y.o. male MRN: [id number]  Unit/Bed#: -01      Subjective:    78 y.o.male POD 0 R TKA. No acute events, no new complaints.      Labs:  0   Lab Value Date/Time    HCT 41.5 08/02/2023 1345    HCT 41.8 06/08/2016 0959    HGB 13.9 08/02/2023 1345    HGB 14.4 06/08/2016 0959    INR 1.05 08/02/2023 1345    WBC 6.04 08/02/2023 1345    WBC 13.90 (H) 06/08/2016 0959       Meds:    Current Facility-Administered Medications:   •  acetaminophen (TYLENOL) tablet 650 mg, 650 mg, Oral, Q8H [address], Denis Arechiga PA-C, 650 mg at 08/24/23 1437  •  ceFAZolin (ANCEF) IVPB (premix in dextrose) 2,000 mg 50 mL, 2,000 mg, Intravenous, Q8H, Kristie Patel MD  •  [START ON 8/25/2023] enoxaparin (LOVENOX) subcutaneous injection 40 mg, 40 mg, Subcutaneous, Daily, Ulises Armando PA-C  •  hydrALAZINE (APRESOLINE) tablet 25 mg, 25 mg, Oral, Q8H PRN, LORI Miguel  •  HYDROmorphone (DILAUDID) injection 0.5 mg, 0.5 mg, Intravenous, Q2H PRN, Ulises Armando PA-C  •  lactated ringers bolus 1,000 mL, 1,000 mL, Intravenous, Once PRN **AND** lactated ringers bolus 1,000 mL, 1,000 mL, Intravenous, Once PRN, Ulises Armando PA-C  •  lactated ringers infusion, 125 mL/hr, Intravenous, Continuous, Denis Arechiga PA-C, Last Rate: 125 mL/hr at 08/24/23 1439, 125 mL/hr at 08/24/23 1439  •  ondansetron (ZOFRAN) injection 4 mg, 4 mg, Intravenous, Q6H PRN, Phyllistine Prabha, PA-C  •  oxyCODONE (ROXICODONE) IR tablet 10 mg, 10 mg, Oral, Q4H PRN, Phyllistine ANAHI Armando, 10 mg at 08/24/23 1437  •  oxyCODONE (ROXICODONE) IR tablet 5 mg, 5 mg, Oral, Q4H PRN, Phyllistine ANAHI Armando  •  senna (SENOKOT) tablet 8.6 mg, 1 tablet, Oral, Daily, Denis Arechiga PA-C, 8.6 mg at 08/24/23 1437  •  sodium chloride 0.9 % bolus 1,000 mL, 1,000 mL, Intravenous, Once PRN **AND** sodium chloride 0.9 % bolus 1,000 mL, 1,000 mL, Intravenous, Once PRN, Phyllistine ANAHI Armando    Blood Culture:   No results found for: "Carmelina Mass"    Wound Culture:   No results found for: "WOUNDCULT"    Ins and Outs:  I/O last 24 hours: In: 1000 [I.V.:1000]  Out: 550 [Urine:550]          Physical:  Vitals:    08/24/23 1643   BP: 123/57   Pulse: 60   Resp: 16   Temp: 97.5 °F (36.4 °C)   SpO2: 98%     Musculoskeletal: right Lower Extremity    · Skin warm, dry . No erythema or ecchymosis. · Dressing c/d/i  · TTP claudia-incisionally  · Sensation intact to saphenous, sural, tibial, superficial peroneal nerve, and deep peroneal  · Motor intact to +FHL/EHL, +ankle dorsi/plantar flexion  · 2+ DP pulse  · Digits warm and well perfused  · Capillary refill < 2 seconds    Assessment:    78 y.o.male POD 0 R TKA . Patient doing well.      Plan:  · WBAT RLE  · Will monitor for ABLA and administer IVF/prbc as indicated for Greater than 2 gram drop or Hgb < 7  · PT/OT  · Pain control  · DVT ppx LVX  · Dispo: pending PT and med clearance    Michel Driver MD

## 2023-08-24 NOTE — INTERVAL H&P NOTE
H&P reviewed. After examining the patient I find no changes in the patients condition since the H&P had been written. Vitals:    08/24/23 0707   BP: 126/62   Pulse: 67   Resp: 16   Temp: 97.6 °F (36.4 °C)   SpO2: 95%    Head: Present  CVS: RRR  Lungs: Clear bilateral  Karthik: Present  Assessment: Adult male with osteoarthritis of the right knee that remains symptomatic despite appropriate nonsurgical treatments. He continues to have both pain and dysfunction  Plan: Right total knee arthroplasty.   Patient is familiar with the risks and benefits

## 2023-08-24 NOTE — ANESTHESIA PROCEDURE NOTES
Peripheral Block    Patient location during procedure: holding area  Start time: 8/24/2023 9:22 AM  Reason for block: at surgeon's request and post-op pain management  Staffing  Performed by: Stanislav Dawson MD  Authorized by: Stanislav Dawson MD    Preanesthetic Checklist  Completed: patient identified, IV checked, site marked, risks and benefits discussed, surgical consent, monitors and equipment checked, pre-op evaluation and timeout performed  Peripheral Block  Patient position: left lateral  Prep: ChloraPrep  Patient monitoring: heart rate, continuous pulse ox and frequent blood pressure checks  Block type: IPACK  Laterality: right  Injection technique: single-shot  Procedures: ultrasound guided, Ultrasound guidance required for the procedure to increase accuracy and safety of medication placement and decrease risk of complications.   Ultrasound permanent image savedbupivacaine (PF) (MARCAINE) 0.25 % injection 20 mL - Perineural   10 mL - 8/24/2023 9:22:00 AM  Needle  Needle type: Stimuplex   Needle gauge: 22 G  Needle length: 4 in  Needle localization: ultrasound guidance  Assessment  Injection assessment: incremental injection, negative aspiration for heme, no paresthesia on injection, frequent aspiration, needle tip visualized at all times, negative aspiration, no symptoms of intraneural/intravenous injection, negative for heart rate change and injected with ease  Paresthesia pain: none  Post-procedure:  site cleaned  patient tolerated the procedure well with no immediate complications  Additional Notes  10 cc exparel

## 2023-08-24 NOTE — PERIOPERATIVE NURSING NOTE
PACU to Floor TRANSFER NOTE      Pre-op Diagnosis:  Primary osteoarthritis of right knee [M17.11]    Procedure Done:  ARTHROPLASTY KNEE TOTAL W ROBOT (Right: Knee)     Post Op Diagnosis:  * No post-op diagnosis entered *    Any Significant Events Intra-Op:  none    Abnormal Assessment in PACU: none    Level of Consciousness:  Level of Consciousness: Alert/awake    Last Set of VS:   Vitals:    08/24/23 1130 08/24/23 1145 08/24/23 1200 08/24/23 1215   BP: 94/54 109/59 122/64 116/72   Pulse: 56 56 (!) 54 (!) 54   Resp: 16 16 16 16   Temp:    97.6 °F (36.4 °C)   TempSrc:       SpO2: 97% 98% 99% 100%   Weight:       Height:                    Baseline Neuro/developmental Status: WDL  Labs Collected: No  Special Needs of the Patient: NA  Antibiotics: Given in OR    MEDICATION LIST LAST 24 HOURS  Periop Administered Medications from 08/23/2023 0023 to 08/24/2023 1223       Date/Time Order Dose Route Action Action by Comments     08/24/2023 0750 EDT acetaminophen (TYLENOL) tablet 975 mg 975 mg Oral Given Osvaldo Wilson RN --     08/24/2023 2453 EDT bupivacaine (PF) (MARCAINE SPINAL) 0.75-8.25 % in dextrose 8.25 % intrathecal injection 1.6 mL Intrathecal Given Deysi Pack CRNA --     08/24/2023 3000 EDT bupivacaine (PF) (MARCAINE) 0.25 % injection 10 mL Perineural Given Symone Chang MD --     08/24/2023 8728 EDT bupivacaine (PF) (MARCAINE) 0.5 % injection 5 mL Perineural Given Symone Chang MD --     08/24/2023 1000 EDT ceFAZolin (ANCEF) IVPB (premix in dextrose) 2,000 mg 50 mL 2,000 mg Intravenous Given Deysi Pack CRNA --     08/24/2023 0750 EDT chlorhexidine (PERIDEX) 0.12 % oral rinse 15 mL 15 mL Swish & Spit Given Osvaldo Wilson RN --     08/24/2023 1004 EDT fentanyl citrate (PF) 100 MCG/2ML 50 mcg Intravenous Given Deysi Pack CRNA --     08/24/2023 7234 EDT fentanyl citrate (PF) 100 MCG/2ML 50 mcg Intravenous Given Symone Chang MD -- 08/24/2023 0750 EDT gabapentin (NEURONTIN) capsule 300 mg 300 mg Oral Given Farzana Boomoises, RN --     08/24/2023 0750 EDT lactated ringers infusion 125 mL/hr Intravenous New Bag Farzana Jean, RN --     08/24/2023 1118 EDT lactated ringers infusion -- Intravenous Anesthesia Volume Adjustment Payton Burnham CRNA --     08/24/2023 1306 EDT lactated ringers infusion -- Intravenous New Bag Payton Burnham CRNA --     08/24/2023 1116 EDT phenylephrine (RADHA-SYNEPHRINE) 50 mg (STANDARD CONCENTRATION) in sodium chloride 0.9% 250 mL 0 mcg/min Intravenous Stopped Payton Burnham CRNA --     08/24/2023 5938 EDT phenylephrine (RADHA-SYNEPHRINE) 50 mg (STANDARD CONCENTRATION) in sodium chloride 0.9% 250 mL 40 mcg/min Intravenous New Bag Payton Burnham CRNA --     08/24/2023 1057 EDT povidone-iodine (BETADINE) 10 % 20 mL in sodium chloride 0.9 % 500 mL irrigation bottle 520 mL Irrigation Given Jose Munoz MD --     08/24/2023 1116 EDT propofol (DIPRIVAN) 1000 mg in 100 mL infusion (premix) 0 mcg/kg/min Intravenous Stopped Payton Burnham CRNA --     08/24/2023 1112 EDT propofol (DIPRIVAN) 1000 mg in 100 mL infusion (premix) 50 mcg/kg/min Intravenous Rate/Dose Change Payton Burnham CRNA --     08/24/2023 0959 EDT propofol (DIPRIVAN) 1000 mg in 100 mL infusion (premix) 80 mcg/kg/min Intravenous New Bag Payton Burnham CRNA --     08/24/2023 6953 EDT propofol (DIPRIVAN) 200 MG/20ML bolus injection 40 mg Intravenous Given Payton Burnham CRNA --     08/24/2023 1058 EDT sodium chloride 0.9 % irrigation solution 1,000 mL Irrigation Given Jose Munoz MD --     08/24/2023 1005 EDT Tranexamic Acid-NaCl (CYKLOKAPRON) 1000-0.7 MG/100ML-% injection 1,000 mg 1,000 mg Intravenous Given Payton Burnham CRNA --             IV Access:   Peripheral IV 08/24/23 Distal;Dorsal (posterior); Left Forearm (Active)   Site Assessment WDL 08/24/23 1200   Dressing Type Transparent 08/24/23 1215   Line Status Flushed; Infusing 08/24/23 1215   Dressing Status Clean;Dry; Intact 08/24/23 1215     IV Fluids: lactated ringers, 125 mL/hr, Last Rate: 125 mL/hr (08/24/23 0750)        INTAKE / OUTPUT  I/O last 24 hours: In: 1000 [I.V.:1000]  Out: 300 [Urine:300]     WOUNDS  Wound 08/24/23 Knee Right (Active)   Date First Assessed/Time First Assessed: 08/24/23 1020   Location: Knee  Wound Location Orientation: Right  Wound Description (Comments): STAPLES AND COOL TEMP PAD  Incision's 1st Dressing: DRESSING ACTICOAT AG 4 X 8 IN (x1), SPONGE GAUZE 4 X 4 16 PLY. .. Assessments 8/24/2023 11:26 AM 8/24/2023 12:15 PM   Wound Description Unable to assess Unable to assess   Ashly-wound Assessment Unable to assess Unable to assess   Wound Site Closure Staples Staples   Drainage Amount None None   Dressing Dry dressing Dry dressing   Dressing Status Clean;Dry; Intact Clean;Dry; Intact       No associated orders.                                                 Time of Last Void or Time that Urinary Catheter was Removed Intra-Op: Griffith in place    Patient family members in attendance upon patient transport to floor:  Not present    Patient Belongings:  none present    Additional Information: N/A    Contact RN:Kelvin

## 2023-08-24 NOTE — ANESTHESIA PREPROCEDURE EVALUATION
Procedure:  ARTHROPLASTY KNEE TOTAL W ROBOT (Right: Knee)    Relevant Problems   CARDIO   (+) Mixed hyperlipidemia   (+) PVC's (premature ventricular contractions)      /RENAL   (+) Prostate CA (HCC)        Physical Exam    Airway    Mallampati score: III  TM Distance: >3 FB  Neck ROM: full     Dental   No notable dental hx     Cardiovascular  Cardiovascular exam normal    Pulmonary  Pulmonary exam normal     Other Findings        Anesthesia Plan  ASA Score- 2     Anesthesia Type- spinal with ASA Monitors. Additional Monitors:   Airway Plan:           Plan Factors-Exercise tolerance (METS): >4 METS. Chart reviewed. EKG reviewed. Existing labs reviewed. Patient summary reviewed. Patient is not a current smoker. Induction- intravenous. Postoperative Plan- Plan for postoperative opioid use. Informed Consent- Anesthetic plan and risks discussed with patient. I personally reviewed this patient with the CRNA. Discussed and agreed on the Anesthesia Plan with the CRNA. Clayton Mishra

## 2023-08-24 NOTE — PROGRESS NOTES
Progress Note - Orthopedics   Shraddha Knox 78 y.o. male MRN: [id number]  Unit/Bed#: -01      Subjective:    78 y.o.male POD 1 R TKA. No acute events, no new complaints. Patient doing well. Pain well controlled. Denies fevers, chills, CP, SOB, N/V, numbness or tingling. Patient reports no issues with urination or bowel movements. Patient states he is feeling well.     Labs:  0   Lab Value Date/Time    HCT 41.5 08/02/2023 1345    HCT 41.8 06/08/2016 0959    HGB 13.9 08/02/2023 1345    HGB 14.4 06/08/2016 0959    INR 1.05 08/02/2023 1345    WBC 6.04 08/02/2023 1345    WBC 13.90 (H) 06/08/2016 0959       Meds:    Current Facility-Administered Medications:   •  acetaminophen (TYLENOL) tablet 650 mg, 650 mg, Oral, Q8H [address], Denis Arechiga PA-C, 650 mg at 08/24/23 1437  •  ceFAZolin (ANCEF) IVPB (premix in dextrose) 2,000 mg 50 mL, 2,000 mg, Intravenous, Q8H, Kristie Patel MD  •  [START ON 8/25/2023] enoxaparin (LOVENOX) subcutaneous injection 40 mg, 40 mg, Subcutaneous, Daily, Ulises Armando PA-C  •  hydrALAZINE (APRESOLINE) tablet 25 mg, 25 mg, Oral, Q8H PRN, LORI Miguel  •  HYDROmorphone (DILAUDID) injection 0.5 mg, 0.5 mg, Intravenous, Q2H PRN, Ulises Armando PA-C  •  lactated ringers bolus 1,000 mL, 1,000 mL, Intravenous, Once PRN **AND** lactated ringers bolus 1,000 mL, 1,000 mL, Intravenous, Once PRN, Ulises Armando PA-C  •  lactated ringers infusion, 125 mL/hr, Intravenous, Continuous, Denis Arechiga PA-C, Last Rate: 125 mL/hr at 08/24/23 1746, 125 mL/hr at 08/24/23 1746  •  ondansetron (ZOFRAN) injection 4 mg, 4 mg, Intravenous, Q6H PRN, Mariolistine ANAHI Armando  •  oxyCODONE (ROXICODONE) IR tablet 10 mg, 10 mg, Oral, Q4H PRN, Phyllistine ANAHI Armando, 10 mg at 08/24/23 1437  •  oxyCODONE (ROXICODONE) IR tablet 5 mg, 5 mg, Oral, Q4H PRN, Phyllistine ANAHI Armando  •  senna (SENOKOT) tablet 8.6 mg, 1 tablet, Oral, Daily, Denis Arechiga PA-C, 8.6 mg at 08/24/23 1437  •  sodium chloride 0.9 % bolus 1,000 mL, 1,000 mL, Intravenous, Once PRN **AND** sodium chloride 0.9 % bolus 1,000 mL, 1,000 mL, Intravenous, Once PRN, Maximilian Bowles PA-C    Blood Culture:   No results found for: "BLOODCX"    Wound Culture:   No results found for: "WOUNDCULT"    Ins and Outs:  I/O last 24 hours: In: 1000 [I.V.:1000]  Out: 550 [Urine:550]          Physical:  Vitals:    08/24/23 1730   BP: 143/69   Pulse: 67   Resp: 16   Temp: 97.5 °F (36.4 °C)   SpO2: 99%     Musculoskeletal: right Lower Extremity     • Skin warm, dry . No erythema or ecchymosis. • Dressing c/d/i  • TTP claudia-incisionally  • Sensation intact to saphenous, sural, tibial, superficial peroneal nerve, and deep peroneal  • Motor intact to +FHL/EHL, +ankle dorsi/plantar flexion  • 2+ DP pulse  • Digits warm and well perfused  • Capillary refill < 2 seconds    Assessment:    78 y.o.male POD 1 R TKA . Patient doing well.      Plan:  · WBAT RLE  · Will monitor for ABLA and administer IVF/prbc as indicated for Greater than 2 gram drop or Hgb < 7  · PT/OT  · Pain control  · DVT ppx LVX  · Dispo: pending PT and med clearance    Daryn Dove MD

## 2023-08-24 NOTE — OP NOTE
OPERATIVE REPORT  PATIENT NAME: [facility]  : 1945  MRN: [id number]  Pt Location:  [facility] OR ROOM 18    Surgery Date: 2023    Surgeon(s) and Role:     * Deep Feliz MD - Primary     * Abbey Tom PA-C - Assisting     * Miriam Shaw MD - Assisting     Preop Diagnosis:  Primary osteoarthritis of right knee [M17.11]    Post-Op Diagnosis Codes:     * Primary osteoarthritis of right knee [M17.11]    Procedure(s):  Right - ARTHROPLASTY KNEE TOTAL W ROBOT    Specimens:  * No specimens in log *    Estimated Blood Loss:   Minimal    Drains:  Closed/Suction Drain Anterior;Right Knee Accordion 10 Fr. (Active)   Number of days: 0       Urethral Catheter Latex;Straight-tip 16 Fr. (Active)   Number of days: 0       Anesthesia Type:   Spinal w/ Femoral Nerve Block     Operative Indications:  Primary osteoarthritis of right knee [M17.11]    Operative Findings:  depuy attune   Femur-6N   Poly-7   Tibia=5   35    Complications:   None    Knee Technique: Suture (direct) Repair  Knee Approach: Medial Parapatellar    Procedure and Technique: Following duction medical level of spinal anesthesia, Griffith catheters and sterilely introduced to this patient's bladder. Antibiotics administered. The right thigh was fitted with a thigh-high tourniquet. The right lower extremity underwent sterile prep and drape. The right lower extremities examined the gravity, the tourniquet plated to 471 mmHg. A midline knee incision was carried the knee in flexion. Full-thickness flaps were raised in order to access the extensor mechanism. A medial arthrotomy was created to open up the knee joint. 2 half pins were placed proximal to tibia, 2 half pins were placed on distal femur. In doing so, modules were created. The arrays and attached the modules. Checkpoints made the proximal tibia distal femur. The knee was then registered. The surgery is planned out of the computer, the plan was finalized.   The robot was docked. First maneuver of the distal femoral cut followed by anterior posterior cuts. The chamfer cuts complete the process. Proximal tibia cut was made next. Care was taken preserve the taken medial collateral, lateral collateral, posterior structures during these maneuvers. The box cut was made for the posterior stabilized unit, remnant medial lateral discectomies and performed. Trials were inserted, the knee was taken through range of motion, found to be able full extension, good flexion, stable throughout. The patella was then resurfaced while utilizing manufactures equipment, was found to be a size 35 mm button. The trial components removed and the knee was prepared for insertion of cemented components. The cemented tibia, cemented femur, trial poly-, cemented patella in place. Excess cement was removed, the knee was brought into extension. The cement was cured. The trial poly was taken out, the knee was packed off. The tourniquet is deflated, hemostasis was secured. The insert polyethylene was then snapped into position. The knee was taken through a final range of motion, found to be A full extension, good flexion, stable throughout, and X patella tracking. Satisfied with the standard surgery, the wounds were flushed with saline and closed. A Betadine soak initiated. A drain is placed deep brought via a separate lateral stab incision. The arthrotomy was closed with number Vicryl suture. The subcu tissue closed with 2-0 Vicryl suture. The skin was closed with staples. Sterile dressings were applied. He was then transferred to recovery in stable condition plans include physical therapy weight-bear tolerance, he will require DVT prophylaxis with Lovenox   I was present for the entire procedure.     Patient Disposition:  PACU             SIGNATURE: Deep Feliz MD  DATE: August 24, 2023  TIME: 11:12 AM

## 2023-08-25 VITALS
WEIGHT: 174 LBS | BODY MASS INDEX: 28.99 KG/M2 | DIASTOLIC BLOOD PRESSURE: 72 MMHG | HEIGHT: 65 IN | RESPIRATION RATE: 18 BRPM | OXYGEN SATURATION: 94 % | HEART RATE: 78 BPM | SYSTOLIC BLOOD PRESSURE: 151 MMHG | TEMPERATURE: 98.3 F

## 2023-08-25 LAB
ANION GAP SERPL CALCULATED.3IONS-SCNC: 6 MMOL/L
BUN SERPL-MCNC: 26 MG/DL (ref 5–25)
CALCIUM SERPL-MCNC: 8.6 MG/DL (ref 8.4–10.2)
CHLORIDE SERPL-SCNC: 104 MMOL/L (ref 96–108)
CO2 SERPL-SCNC: 30 MMOL/L (ref 21–32)
CREAT SERPL-MCNC: 0.89 MG/DL (ref 0.6–1.3)
ERYTHROCYTE [DISTWIDTH] IN BLOOD BY AUTOMATED COUNT: 13.3 % (ref 11.6–15.1)
GFR SERPL CREATININE-BSD FRML MDRD: 80 ML/MIN/1.73SQ M
GLUCOSE P FAST SERPL-MCNC: 154 MG/DL (ref 65–99)
GLUCOSE SERPL-MCNC: 154 MG/DL (ref 65–140)
HCT VFR BLD AUTO: 39 % (ref 36.5–49.3)
HGB BLD-MCNC: 13.1 G/DL (ref 12–17)
MCH RBC QN AUTO: 32 PG (ref 26.8–34.3)
MCHC RBC AUTO-ENTMCNC: 33.6 G/DL (ref 31.4–37.4)
MCV RBC AUTO: 95 FL (ref 82–98)
PLATELET # BLD AUTO: 185 THOUSANDS/UL (ref 149–390)
PMV BLD AUTO: 10.5 FL (ref 8.9–12.7)
POTASSIUM SERPL-SCNC: 4.6 MMOL/L (ref 3.5–5.3)
RBC # BLD AUTO: 4.1 MILLION/UL (ref 3.88–5.62)
SODIUM SERPL-SCNC: 140 MMOL/L (ref 135–147)
WBC # BLD AUTO: 10.27 THOUSAND/UL (ref 4.31–10.16)

## 2023-08-25 PROCEDURE — 99232 SBSQ HOSP IP/OBS MODERATE 35: CPT | Performed by: INTERNAL MEDICINE

## 2023-08-25 PROCEDURE — 85027 COMPLETE CBC AUTOMATED: CPT | Performed by: NURSE PRACTITIONER

## 2023-08-25 PROCEDURE — 97116 GAIT TRAINING THERAPY: CPT

## 2023-08-25 PROCEDURE — 80048 BASIC METABOLIC PNL TOTAL CA: CPT | Performed by: PHYSICIAN ASSISTANT

## 2023-08-25 PROCEDURE — NC001 PR NO CHARGE

## 2023-08-25 PROCEDURE — NC001 PR NO CHARGE: Performed by: ORTHOPAEDIC SURGERY

## 2023-08-25 PROCEDURE — 97530 THERAPEUTIC ACTIVITIES: CPT

## 2023-08-25 PROCEDURE — 97167 OT EVAL HIGH COMPLEX 60 MIN: CPT

## 2023-08-25 PROCEDURE — 99232 SBSQ HOSP IP/OBS MODERATE 35: CPT

## 2023-08-25 RX ADMIN — ACETAMINOPHEN 650 MG: 325 TABLET, FILM COATED ORAL at 05:54

## 2023-08-25 RX ADMIN — OXYCODONE HYDROCHLORIDE 5 MG: 5 TABLET ORAL at 08:33

## 2023-08-25 RX ADMIN — OXYCODONE HYDROCHLORIDE 10 MG: 5 TABLET ORAL at 04:17

## 2023-08-25 RX ADMIN — CEFAZOLIN SODIUM 2000 MG: 2 SOLUTION INTRAVENOUS at 04:19

## 2023-08-25 RX ADMIN — ACETAMINOPHEN 650 MG: 325 TABLET, FILM COATED ORAL at 00:10

## 2023-08-25 RX ADMIN — ENOXAPARIN SODIUM 40 MG: 40 INJECTION SUBCUTANEOUS at 00:12

## 2023-08-25 NOTE — CONSULTS
Please see progress note from today by this provider for formal details of encounter.     LORI Santana  Acute Pain Service

## 2023-08-25 NOTE — PHYSICAL THERAPY NOTE
Physical Therapy Progress Note     08/25/23 0943   PT Last Visit   PT Visit Date 08/25/23   Note Type   Note Type Treatment   Pain Assessment   Pain Assessment Tool FLACC   Pain Rating: FLACC (Rest) - Face 0   Pain Rating: FLACC (Rest) - Legs 0   Pain Rating: FLACC (Rest) - Activity 0   Pain Rating: FLACC (Rest) - Cry 0   Pain Rating: FLACC (Rest) - Consolability 0   Score: FLACC (Rest) 0   Pain Rating: FLACC (Activity) - Face 2   Pain Rating: FLACC (Activity) - Legs 1   Pain Rating: FLACC (Activity) - Activity 1   Pain Rating: FLACC (Activity) - Cry 1   Pain Rating: FLACC (Activity) - Consolability 0   Score: FLACC (Activity) 5   Restrictions/Precautions   RLE Weight Bearing Per Order WBAT   Other Precautions WBS;Pain; Fall Risk   Subjective   Subjective pt encountered seated in recliner, pleasant and agreeable to treatment. Reports controlled pain at rest, increased with activity, but resides quickly once sitting. Transfers   Sit to Stand 5  Supervision   Additional items Assist x 1; Armrests; Increased time required   Stand to Sit 5  Supervision   Additional items Assist x 1; Armrests; Increased time required   Ambulation/Elevation   Gait pattern Step to;Short stride; Foward flexed; Inconsistent brittni; Shuffling;Decreased foot clearance; Antalgic; Improper Weight shift   Gait Assistance 5  Supervision   Additional items Assist x 1   Assistive Device Rolling walker   Distance 20', 35', 60'   Curbs x1 curb forwards with RW & min A   Balance   Static Sitting Good   Static Standing Fair   Ambulatory Fair -   Activity Tolerance   Activity Tolerance Patient tolerated treatment well;Patient limited by fatigue;Patient limited by pain   Nurse [address], RN   Assessment   Prognosis Good   Problem List Decreased strength;Decreased range of motion; Impaired balance;Decreased mobility;Pain   Assessment pt demonstrated improved functional mobilty today, ambulating up to household distances with use of RW.   He demosntrated improved posture, RW control & UE support when RW adjusted after 1st trial where he demonstrated difficulties with the same. He ambulated with improved pace, foot clearance & stride length by conclusion of session. After instructiosn given, pt was also able to negotiate curb steps as noted above without RLE buckling or significant instability. He reports confidence in ability to perform all tasks in preparation for home d/c when medically cleared. pt instructed in importance of mobility, energy conservation & safety awareness during acute phase of healing, to which he verbalized understanding. From PT perspecitve, pt has demosntrated sufficient progress to d/c home with family support & OPPT follow up to progress to PLOF. Goals   Patient Goals to go home   LTG Expiration Date 08/31/23   PT Treatment Day 1   Plan   Treatment/Interventions Functional transfer training;LE strengthening/ROM; Elevations; Therapeutic exercise; Endurance training;Patient/family training;Equipment eval/education; Bed mobility;Gait training   Progress Progressing toward goals   PT Frequency Twice a day   Recommendation   PT Discharge Recommendation Home with outpatient rehabilitation   Equipment Recommended   (pt reports owning all DME)   AM-PAC Basic Mobility Inpatient   Turning in Flat Bed Without Bedrails 3   Lying on Back to Sitting on Edge of Flat Bed Without Bedrails 3   Moving Bed to Chair 4   Standing Up From Chair Using Arms 4   Walk in Room 4   Climb 3-5 Stairs With Railing 3   Basic Mobility Inpatient Raw Score 21   Basic Mobility Standardized Score 45.55   Highest Level Of Mobility   JH-HLM Goal 6: Walk 10 steps or more   JH-HLM Achieved 7: Walk 25 feet or more       Carlos Mages, PTA    An [facility] Basic Mobility Raw Score less than 17 suggests pt would benefit from post acute rehab. Please also refer to the recommendation of the Physical Therapist for safe discharge planning.

## 2023-08-25 NOTE — DISCHARGE SUMMARY
ORTHOPEDICS DISCHARGE SUMMARY  Blayne Julio 78 y.o. male MRN: [id number]  Unit/Bed#: -01    Attending Physician: Gwen Boyce    Admitting diagnosis: Primary osteoarthritis of right knee [M17.11]    Discharge diagnosis: Primary osteoarthritis of right knee [M17.11]    Date of admission: 8/24/2023    Date of discharge: 08/25/23         Procedure: Right Total Knee Arthroplasty    HPI:  This is a 78y.o. year old male that presented to the office with signs and symptoms of right knee osteoarthritis. They tried and failed conservative treatment measures and wished to proceed with surgical intervention. The risks, benefits, and complications of the procedure were discussed with the patient and informed consent was obtained. Hospital Course: The patient was admitted to the hospital on 8/24/2023 and underwent an uncomplicated right total knee arthroplasty. They were transferred to the floor after a brief stay in the post-anesthesia care unit. Their pain was well managed with IV and oral pain medications. They began therapy on post operative day #1. Lovenox 40mg was also started for DVT prophylaxis 12 hours post operatively. Hemovac drain was removed on POD1. On discharge date pt was cleared by PT and the medicine team and determined to be safe for discharge. Daily discussion was had with the patient, nursing staff, orthopaedic team, and family members if present. All questions were answered to the patients satisifaction. 0   Lab Value Date/Time    HGB 13.1 08/25/2023 0445    HGB 13.9 08/02/2023 1345    HGB 14.4 06/08/2016 0959       Body mass index is 28.96 kg/m². mildly obese. Recommend behavior modifications, nutrition and physical activity. Discharge Instructions: The patient was discharged weight bearing as tolerated to the right lower extremity. Lovenox 40mg will be continued for 28 days. Continue PT/OT. Take pain medications as instructed. Discharge Medications:   For the complete list of discharge medications, please refer to the patient's medication reconciliation.

## 2023-08-25 NOTE — DISCHARGE INSTR - AVS FIRST PAGE
Discharge Instructions - Orthopedics  Otto Almeida 78 y.o. male MRN: [id number]  Unit/Bed#: -01    Weight Bearing Status: You may bear weight as tolerated on your Right Lower Extremity. DVT prophylaxis:  Complete course of Lovenox as directed    Pain:  Continue pain medications as needed    Dressing Instructions:   Please keep clean, dry and intact until follow up. Appt Instructions: If you do not have your appointment, please call the clinic at [phone]  Otherwise follow up as scheduled. Contact the office sooner if you experience any increased numbness/tingling in the extremities.

## 2023-08-25 NOTE — PROGRESS NOTES
Peripheral Nerve Block Follow-up Note - Acute Pain Service    Wilder Faye 78 y.o. male MRN: [id number]  Unit/Bed#: -01 Encounter: [id number]      Assessment:   Principal Problem:    Primary osteoarthritis of right knee    Wilder Faye is a 78y.o. year old male with history of right knee osteoarthritis. Patient underwent a right sided total knee arthroplasty with orthopedic surgery on 8/20/2023. Patient received right-sided single shot adductor canal and ipack blocks with Exparel for postoperative analgesia. Acute pain service consulted for postoperative block follow-up. Seen and examined at bedside this morning, reports pain has been well controlled postoperatively. Patient reports he was able to ambulate and complete stairs with physical therapy this morning with minimal discomfort, and has been cleared for discharge. He has utilized as needed oxycodone appropriately with good analgesic effect and is cleared for discharge from a pain management standpoint. Plan:   Right-sided single shot adductor canal and ipack blocks with Exparel are functioning appropriately, he reports resolving sensory deficit, and denies motor deficit.    - Multimodal analgesia with:  · Recommend discontinuation of IV Dilaudid 0.5 mg every 2 hours as needed, for breakthrough pain  · Continuation of oxycodone 2.5 mg / 5 mg every 4 hours as needed, for moderate/severe pain at time of discharge  · Continue Tylenol 650 mg every 8 hours scheduled  · Recommend frequent mobility and physical therapy to prevent muscle tightness which may contribute to pain  · Recommend ice, 20 minutes on/20 minutes off, to affected knee    APS will sign off at this time. Thank you for the consult. All opioids and other analgesics to be written at discretion of primary team. Please contact Acute Pain Service - [facility] via Flo[employer] from [phone] with additional questions or concerns.  See Floob[employer] or Karen for additional contacts and after hours information. Pain History  Current pain location(s): Right knee  Pain Scale: 4  Quality: Aching  24 hour history: No acute events overnight, patient remains hemodynamically stable. Patient has been up and ambulating with physical therapy and has been cleared for home. Tolerating current multimodal regimen, denies any opioid-induced side effects. He denies shortness of breath, nausea/vomiting, constipation/diarrhea. Opioid requirement previous 24 hours:   35 mg oxycodone    Meds/Allergies   all current active meds have been reviewed and current meds:   Current Facility-Administered Medications   Medication Dose Route Frequency   • acetaminophen (TYLENOL) tablet 650 mg  650 mg Oral Q8H [address]   • enoxaparin (LOVENOX) subcutaneous injection 40 mg  40 mg Subcutaneous Daily   • hydrALAZINE (APRESOLINE) tablet 25 mg  25 mg Oral Q8H PRN   • HYDROmorphone (DILAUDID) injection 0.5 mg  0.5 mg Intravenous Q2H PRN   • lactated ringers bolus 1,000 mL  1,000 mL Intravenous Once PRN    And   • lactated ringers bolus 1,000 mL  1,000 mL Intravenous Once PRN   • lactated ringers infusion  125 mL/hr Intravenous Continuous   • ondansetron (ZOFRAN) injection 4 mg  4 mg Intravenous Q6H PRN   • oxyCODONE (ROXICODONE) IR tablet 10 mg  10 mg Oral Q4H PRN   • oxyCODONE (ROXICODONE) IR tablet 5 mg  5 mg Oral Q4H PRN   • senna (SENOKOT) tablet 8.6 mg  1 tablet Oral Daily   • sodium chloride 0.9 % bolus 1,000 mL  1,000 mL Intravenous Once PRN    And   • sodium chloride 0.9 % bolus 1,000 mL  1,000 mL Intravenous Once PRN       No Known Allergies    Objective     Temp:  [97.4 °F (36.3 °C)-98.3 °F (36.8 °C)] 98.3 °F (36.8 °C)  HR:  [54-78] 78  Resp:  [14-18] 18  BP: ()/(49-77) 151/72    Physical Exam  Vitals reviewed. Constitutional:       General: He is not in acute distress. Appearance: He is not ill-appearing or toxic-appearing. Cardiovascular:      Rate and Rhythm: Normal rate.    Pulmonary:      Effort: Pulmonary effort is normal. No respiratory distress. Abdominal:      General: Abdomen is flat. There is no distension. Palpations: Abdomen is soft. Tenderness: There is no abdominal tenderness. Musculoskeletal:         General: Swelling (R knee) and tenderness (R knee) present. Normal range of motion. Cervical back: Normal range of motion. Right lower leg: Edema (trace) present. Left lower leg: No edema. Skin:     General: Skin is warm and dry. Coloration: Skin is not pale. Findings: No rash. Neurological:      Mental Status: He is alert and oriented to person, place, and time. Mental status is at baseline. Sensory: No sensory deficit. Motor: No weakness. Lab Results:   Results from last 7 days   Lab Units 08/25/23  0445   WBC Thousand/uL 10.27*   HEMOGLOBIN g/dL 13.1   HEMATOCRIT % 39.0   PLATELETS Thousands/uL 185      Results from last 7 days   Lab Units 08/25/23  0445   POTASSIUM mmol/L 4.6   CHLORIDE mmol/L 104   CO2 mmol/L 30   BUN mg/dL 26*   CREATININE mg/dL 0.89   CALCIUM mg/dL 8.6       Imaging Studies: I have personally reviewed pertinent reports. Counseling / Coordination of Care  Total floor / unit time spent today 15 minutes. Greater than 50% of total time was spent with the patient and / or family counseling and / or coordination of care. A description of the counseling / coordination of care: Patient interview, physical examination, review of PDMP, review of medical record, review of imaging and laboratory data, development of pain management plan, discussion of pain management plan with patient and primary service. Please note that the APS provides consultative services regarding pain management only. With the exception of ketamine, peripheral nerve catheters, and epidural infusions (and except when indicated), final decisions regarding starting or changing doses of analgesic medications are at the discretion of the consulting service. Off hours consultation and/or medication management is generally not available.     LORI Pichardo  Acute Pain Service

## 2023-08-25 NOTE — PROGRESS NOTES
Internal Medicine Progress Note  Patient: Thea Rios  Age/sex: 78 y.o. male  Medical Record #: [id number]      ASSESSMENT/PLAN: (Interval History)  Thea Rios is seen and examined and management for following issues:    Status Post right Total KNEE ARTHROPLASTY  • Pain controlled  • Continue encourage incentive spirometry; monitor fever curve  • DVT prophylaxis in place and reviewed  Results from last 7 days   Lab Units 08/25/23  0445   WBC Thousand/uL 10.27*   HEMOGLOBIN g/dL 13.1   HEMATOCRIT % 39.0   PLATELETS Thousands/uL 185   •       CKD  • Level 3  • Baseline 0.9-1.2  • Avoid nephrotoxic medications  • Avoid hypotension in the post operative setting  • Monitor bmp     Impaired fasting glucose  • Hgb A1c 6.0  • Monitor FBS     H/o Prostate CA  • Monitor for post operative retention              PRE-OP HGB LEVEL: 13. 9=OK for dc fro medicine standpoint  The above assessment and plan was reviewed and updated as determined by my evaluation of the patient on 8/25/2023.     Labs:   Results from last 7 days   Lab Units 08/25/23  0445 08/24/23  1417   WBC Thousand/uL 10.27*  --    HEMOGLOBIN g/dL 13.1  --    HEMATOCRIT % 39.0  --    PLATELETS Thousands/uL 185 193     Results from last 7 days   Lab Units 08/25/23  0445 08/24/23  1551   SODIUM mmol/L 140 139   POTASSIUM mmol/L 4.6 3.8   CHLORIDE mmol/L 104 106   CO2 mmol/L 30 28   BUN mg/dL 26* 31*   CREATININE mg/dL 0.89 0.97   CALCIUM mg/dL 8.6 8.2*             Results from last 7 days   Lab Units 08/24/23  1130   POC GLUCOSE mg/dl 128       Review of Scheduled Meds:  Current Facility-Administered Medications   Medication Dose Route Frequency Provider Last Rate   • acetaminophen  650 mg Oral Q8H [facility] Denis Arechiga PA-C     • enoxaparin  40 mg Subcutaneous Daily Jose Nolen PA-C     • hydrALAZINE  25 mg Oral Q8H PRN LORI Telles     • HYDROmorphone  0.5 mg Intravenous Q2H PRN Jose Nolen PA-C     • lactated ringers  1,000 mL Intravenous Once PRN Janette Duckworth PA-C      And   • lactated ringers  1,000 mL Intravenous Once PRN Janette Duckworth PA-C     • lactated ringers  125 mL/hr Intravenous Continuous Janette Duckworth PA-C 125 mL/hr (08/24/23 1746)   • ondansetron  4 mg Intravenous Q6H PRN Janette Duckworth PA-C     • oxyCODONE  10 mg Oral Q4H PRN Janette Duckworth PA-C     • oxyCODONE  5 mg Oral Q4H PRN Janette Duckworth PA-C     • senna  1 tablet Oral Daily Denis Arechiga PA-C     • sodium chloride  1,000 mL Intravenous Once PRN Janette Duckworth PA-C      And   • sodium chloride  1,000 mL Intravenous Once PRN Janette Duckworth PA-C         Subjective/ HPI: Patient seen and examined. Patients overnight issues or events were reviewed with nursing or staff during rounds or morning huddle session. New or overnight issues include the following:     Pt without any overnight events or reported nursing issues      ROS:   A 10 point ROS was performed; negative except as noted above.        Imaging:     No orders to display       *Labs /Radiology studies Reviewed  *Medications  reviewed and reconciled as needed  *Please refer to order section for additional ordered labs studies  *Case discussed with primary attending during morning huddle case rounds    Physical Examination:  Vitals:   Vitals:    08/24/23 1730 08/24/23 1928 08/24/23 2220 08/25/23 0707   BP: 143/69 144/69 130/66 151/72   Pulse: 67 74 73 78   Resp: 16 15 18 18   Temp: 97.5 °F (36.4 °C) 98.2 °F (36.8 °C) 97.9 °F (36.6 °C) 98.3 °F (36.8 °C)   TempSrc:       SpO2: 99% 98% 96% 94%   Weight:       Height:           General Appearance: no distress, conversive  HEENT: PERRLA, conjuctiva normal; oropharynx clear; mucous membranes moist;   Neck:  Supple, no lymphadenopathy or thyromegaly  Lungs: CTA, normal respiratory effort, no retractions, expiratory effort normal  CV: regular rate and rhythm , PMI normal   ABD: soft non tender, +BS x4  EXT: DP pulses intact, no lymphadenopathy, no edema; right knee surgical dressing in place  Skin: normal turgor, normal texture, no rash  Psych: affect normal, mood normal  Neuro: AAOx3    : pineda removed ; due to void    The above physical exam was reviewed and updated as determined by my evaluation of the patient on 8/25/2023. Invasive Devices     Peripheral Intravenous Line  Duration           Peripheral IV 08/24/23 Distal;Dorsal (posterior); Left Forearm 1 day          Drain  Duration           Closed/Suction Drain Anterior;Right Knee Accordion 10 Fr. <1 day                   VTE Pharmacologic Prophylaxis: Enoxaparin  Code Status: Prior  Current Length of Stay: 0 day(s)      Total floor / unit time spent today 30 minutes  Coordination of patient's care was performed in conjunction with primary service. Time invested included review of patient's labs, vitals, and management of their comorbidities with continued monitoring, examination of patient as well as answering patient questions, documenting her findings and creating progress note in electronic medical record,  ordering appropriate diagnostic testing. Medical decision making for the day was made by supervising physician unless otherwise noted in their attestation statement. ** Please Note:  voice to text software may have been used in the creation of this document.  Although proof errors in transcription or interpretation are a potential of such software**

## 2023-08-25 NOTE — PLAN OF CARE
Problem: MOBILITY - ADULT  Goal: Maintain or return to baseline ADL function  Description: INTERVENTIONS:  -  Assess patient's ability to carry out ADLs; assess patient's baseline for ADL function and identify physical deficits which impact ability to perform ADLs (bathing, care of mouth/teeth, toileting, grooming, dressing, etc.)  - Assess/evaluate cause of self-care deficits   - Assess range of motion  - Assess patient's mobility; develop plan if impaired  - Assess patient's need for assistive devices and provide as appropriate  - Encourage maximum independence but intervene and supervise when necessary  - Involve family in performance of ADLs  - Assess for home care needs following discharge   - Consider OT consult to assist with ADL evaluation and planning for discharge  - Provide patient education as appropriate  Outcome: Adequate for Discharge  Goal: Maintains/Returns to pre admission functional level  Description: INTERVENTIONS:  - Perform BMAT or MOVE assessment daily.   - Set and communicate daily mobility goal to care team and patient/family/caregiver. - Collaborate with rehabilitation services on mobility goals if consulted  - Perform Range of Motion 3 times a day. - Reposition patient every 2 hours.   - Dangle patient 3 times a day  - Stand patient 3 times a day  - Ambulate patient 3 times a day  - Out of bed to chair 3 times a day   - Out of bed for meals 3 times a day  - Out of bed for toileting  - Record patient progress and toleration of activity level   Outcome: Adequate for Discharge

## 2023-08-25 NOTE — OCCUPATIONAL THERAPY NOTE
Occupational Therapy Evaluation     Patient Name: Estrella Ball  YNKVJ'E Date: 8/25/2023  Problem List  Principal Problem:    Primary osteoarthritis of right knee    Past Medical History  Past Medical History:   Diagnosis Date    Colon polyp     Dermatitis     Prostate CA ([address])     Rotator cuff injury     left    Syncope     6-7 years ago and 10-15 years ago. Past Surgical History  Past Surgical History:   Procedure Laterality Date    ANKLE FRACTURE SURGERY      CHOLECYSTECTOMY      COLONOSCOPY      LA ARTHRP KNE CONDYLE&PLATU MEDIAL&LAT COMPARTMENTS Right 8/24/2023    Procedure: ARTHROPLASTY KNEE TOTAL W ROBOT;  Surgeon: Keri Carrera MD;  Location: [facility];  Service: Orthopedics    PROSTATE CANCER GENE 3(PCA3) (HISTORICAL)  04/2019     with radiation         08/25/23 0920   OT Last Visit   OT Visit Date 08/25/23   Note Type   Note type Evaluation   Pain Assessment   Pain Assessment Tool 0-10   Pain Score 2   Pain Location/Orientation Orientation: Right;Location: Knee   Restrictions/Precautions   Weight Bearing Precautions Per Order Yes   RLE Weight Bearing Per Order WBAT   Other Precautions Fall Risk;Pain;Multiple lines   Home Living   Type of [address] [place] [address] Two level; Able to live on main level with bedroom/bathroom   Bathroom Shower/Tub Walk-in shower   Bathroom Toilet Raised   Bathroom Equipment Shower chair   [place]   Prior Function   Level of [address] with ADLs   Lives With Spouse   Receives Help From Family   IADLs Independent with driving   Falls in the last 6 months 0   Vocational Retired   Lifestyle   Autonomy pta pt reports I in ADLs/IADLs/functional mobility   Reciprocal Relationships spouse   Service to Others retired   Intrinsic Gratification horse back riding   Subjective   Subjective "My wife can help with my socks"   ADL   Where Assessed Chair   Eating Assistance 5  Supervision/Setup   Grooming Assistance 5  Supervision/Setup   UB Bathing Assistance 5  Supervision/Setup   LB Bathing Assistance 3  Moderate Assistance   UB Dressing Assistance 5  Supervision/Setup   LB Dressing Assistance 3  Moderate Assistance   Toileting Assistance  4  Minimal Assistance   Transfers   Sit to Stand 4  Minimal assistance   Additional items Assist x 1   Stand to Sit 4  Minimal assistance   Additional items Assist x 1   Functional Mobility   Functional Mobility 4  Minimal assistance   Additional Comments CGA   Additional items Rolling walker   Balance   Static Sitting Good   Static Standing Fair   Ambulatory Fair -   Activity Tolerance   Activity Tolerance Patient tolerated treatment well   Nurse Made Aware okay to see per RN   RUE Assessment   RUE Assessment WFL   LUE Assessment   LUE Assessment WFL   Hand Function   Gross Motor Coordination Functional   Fine Motor Coordination Functional   Cognition   Overall Cognitive Status WFL   Arousal/Participation Cooperative   Attention Within functional limits   Orientation Level Oriented X4   Memory Within functional limits   Following Commands Follows all commands and directions without difficulty   Comments pt pleasant and cooperative   Assessment   Limitation Decreased ADL status; Decreased Safe judgement during ADL;Decreased high-level ADLs; Decreased self-care trans   Prognosis Good   Assessment Pt is a 78 y.o. YO  male admitted to [facility] on 8/24/2023 w/ R TKA. Pt  has a past medical history of Colon polyp, Dermatitis, Prostate CA ([address]), Rotator cuff injury, and Syncope. Pt with active OT orders. Pt resides in a house with spouse. Pt was I w/  ADLS and IADLS, (+) drove, & required no use of DME PTA. Currently pt is supervision for ub adls, mod a for lb adls, and min a for transfers. Pt is limited at this time 2*: endurance, activity tolerance, functional mobility, decreased I w/ ADLS/IADLS, decreased safety awareness and decreased insight into deficits. The following Occupational Performance Areas to address include: bathing/shower, toilet hygiene, dressing, functional mobility and household maintenance. Based on the aforementioned OT evaluation, functional performance deficits, and assessments, pt has been identified as a high complexity evaluation. From OT standpoint, anticipate d/c home with family support. Pt to continue to benefit from acute immediate OT services to address the following goals 2-3x sessions to  w/in 3-5 days:. The patient's raw score on the AM-PAC Daily Activity Inpatient Short Form is 19. A raw score of greater than or equal to 19 suggests the patient may benefit from discharge to post-acute rehabilitation services. Please refer to the recommendation of the Occupational Therapist for safe discharge planning. Goals   Patient Goals go home   STG Time Frame 3-5   Plan   Treatment Interventions ADL retraining;Functional transfer training; Endurance training;Patient/family training; Compensatory technique education   Goal Expiration Date 23   OT Frequency 2-3x/wk   Recommendation   OT Discharge Recommendation No rehabilitation needs   AM-PAC Daily Activity Inpatient   Lower Body Dressing 2   Bathing 2   Toileting 3   Upper Body Dressing 4   Grooming 4   Eating 4   Daily Activity Raw Score 19   Daily Activity Standardized Score (Calc for Raw Score >=11) 40.22   AM-PAC Applied Cognition Inpatient   Following a Speech/Presentation 4   Understanding Ordinary Conversation 4   Taking Medications 4   Remembering Where Things Are Placed or Put Away 4   Remembering List of 4-5 Errands 4   Taking Care of Complicated Tasks 4   Applied Cognition Raw Score 24   Applied Cognition Standardized Score 62.21       GOALS    Pt will complete UB/LB dressing/self care w/ S using adaptive device and DME as needed    Pt will complete bathing w/ S w/ use of AE and DME as needed     Pt will improve functional mobility during ADL/IADL/leisure tasks to S using DME as needed w/ G balance/safety     Pt will demonstrate G carryover of pt/caregiver education and training as appropriate including posterior total hip precautions.     Althia Elysia, MS, OTR/L

## 2023-08-25 NOTE — CASE MANAGEMENT
Case Management Assessment & Discharge Planning Note    Patient name Otto Almeida  Location [address] [facility] [id number] MRN [id number]  : 1945 Date 2023       Current Admission Date: 2023  Current Admission Diagnosis:Primary osteoarthritis of right knee   Patient Active Problem List    Diagnosis Date Noted   • Primary osteoarthritis of right knee 2023   • Encounter for geriatric assessment 2023   • Mixed hyperlipidemia 2021   • Prostate CA ([address])    • Rotator cuff strain, left, initial encounter 2020   • PVC's (premature ventricular contractions) 2016   • Closed head injury 2016   • Contusion of jaw 2016   • Syncope 2016   • History of colonic polyps 2012   • Erectile dysfunction 05/10/2010   • Impaired fasting glucose 05/10/2010      LOS (days): 0  Geometric Mean LOS (GMLOS) (days):   Days to GMLOS:     OBJECTIVE:     Current admission status: Outpatient Surgery    Preferred Pharmacy:   [id number] - [facility] [address]  [address] [facility] [address]  Phone: [phone] Fax: [phone]    [address] [facility] [place] [address] [facility] [address] [place] [address]  Phone: [phone] Fax: [phone]    Primary Care Provider: Lynn Cancino DO    Primary Insurance: [address] [facility]  Secondary Insurance:     ASSESSMENT:  [address], [address] [facility] Representative - Spouse   Primary Phone: [phone] (Mobile)  Home Phone: [phone]               Advance Directives  Does patient have a [address]?: Yes  Does patient have Advance Directives?: Yes  Primary Contact: Farzad Diana (Spouse)    Patient Information  Admitted from[de-identified] Home  Mental Status: Alert  During Assessment patient was accompanied by: Not accompanied during assessment  Assessment information provided by[de-identified] Patient  Primary Caregiver: Self  Support Systems: Spouse/significant other  Home entry access options. Select all that apply.: Stairs  Number of steps to enter home.: 2 (2 curb steps to enter)  Type of Current Residence: 2 story home (pt able to have a first floor setup if needed.  Full bath on first floor.)  Upon entering residence, is there a bedroom on the main floor (no further steps)?: No  A bedroom is located on the following floor levels of residence (select all that apply):: 2nd Floor  Upon entering residence, is there a bathroom on the main floor (no further steps)?: Yes  Number of steps to 2nd floor from main floor: One Flight  Living Arrangements: Lives w/ Spouse/significant other    Activities of Daily Living Prior to Admission  Functional Status: Independent  Completes ADLs independently?: Yes  Ambulates independently?: Yes  Does patient use assisted devices?: Yes  Assisted Devices (DME) used: [employer], Shower Chair  Does patient currently own DME?: Yes  What DME does the patient currently own?: Inessa Norman  Does patient have a history of Outpatient Therapy (PT/OT)?: Yes ([facility] therapy [address], would like to resume care upon d/c.)  Does the patient have a history of Short-Term Rehab?: No  Does patient have a history of HHC?: No  Does patient currently have [address] [facility]?: No    Patient Information Continued  Does patient have prescription coverage?: Yes  Does patient have a history of substance abuse?: No  Does patient have a history of Mental Health Diagnosis?: No    Means of Transportation  Means of Transport to [facility][de-identified] Family transport        DISCHARGE DETAILS:    Discharge planning discussed with[de-identified] patient at bedside  Freedom of Choice: Yes  Comments - Freedom of Choice: therapy recommendations discussed, pt requesting to resume care with [facility] OP therapy on [address]     Were Treatment Team discharge recommendations reviewed with patient/caregiver?: Yes  Did patient/caregiver verbalize understanding of patient care needs?: Yes  Were patient/caregiver advised of the risks associated with not following Treatment Team discharge recommendations?: Yes    DME Referral Provided  Referral made for DME?: No (pt already owns RW)    Other Referral/Resources/Interventions Provided:  Interventions: Outpatient OT, Outpatient PT  Referral Comments: OP therapy list offered at bedside, pt already established with  OP therapy on [address]. DME recommendations discussed, pt already owns a RW. Pt's spouse to provide transportation on d/c. No further CM needs reported at this time.     Treatment Team Recommendation: Home  Discharge Destination Plan[de-identified] Home (Home with OP therapy)  Transport at Discharge : Automobile, Family

## 2023-08-25 NOTE — PLAN OF CARE
Problem: OCCUPATIONAL THERAPY ADULT  Goal: Performs self-care activities at highest level of function for planned discharge setting. See evaluation for individualized goals. Description: Treatment Interventions: ADL retraining, Functional transfer training, Endurance training, Patient/family training, Compensatory technique education          See flowsheet documentation for full assessment, interventions and recommendations. Note: Limitation: Decreased ADL status, Decreased Safe judgement during ADL, Decreased high-level ADLs, Decreased self-care trans  Prognosis: Good  Assessment: Pt is a 78 y.o. YO  male admitted to [facility] on 2023 w/ R TKA. Pt  has a past medical history of Colon polyp, Dermatitis, Prostate CA ([address]), Rotator cuff injury, and Syncope. Pt with active OT orders. Pt resides in a house with spouse. Pt was I w/  ADLS and IADLS, (+) drove, & required no use of DME PTA. Currently pt is supervision for ub adls, mod a for lb adls, and min a for transfers. Pt is limited at this time 2*: endurance, activity tolerance, functional mobility, decreased I w/ ADLS/IADLS, decreased safety awareness and decreased insight into deficits. The following Occupational Performance Areas to address include: bathing/shower, toilet hygiene, dressing, functional mobility and household maintenance. Based on the aforementioned OT evaluation, functional performance deficits, and assessments, pt has been identified as a high complexity evaluation. From OT standpoint, anticipate d/c home with family support. Pt to continue to benefit from acute immediate OT services to address the following goals 2-3x sessions to  w/in 3-5 days:. The patient's raw score on the AM-PAC Daily Activity Inpatient Short Form is 19. A raw score of greater than or equal to 19 suggests the patient may benefit from discharge to post-acute rehabilitation services.  Please refer to the recommendation of the Occupational Therapist for safe discharge planning.      OT Discharge Recommendation: No rehabilitation needs

## 2023-08-25 NOTE — PLAN OF CARE
Problem: PHYSICAL THERAPY ADULT  Goal: Performs mobility at highest level of function for planned discharge setting. See evaluation for individualized goals. Description: Treatment/Interventions: Functional transfer training, LE strengthening/ROM, Elevations, Therapeutic exercise, Patient/family training, Equipment eval/education, Bed mobility, Gait training, OT, Spoke to nursing  Equipment Recommended: Ryan Ho (has RW)       See flowsheet documentation for full assessment, interventions and recommendations. Outcome: Progressing  Note: Prognosis: Good  Problem List: Decreased strength, Decreased range of motion, Impaired balance, Decreased mobility, Pain  Assessment: pt demonstrated improved functional mobilty today, ambulating up to household distances with use of RW. He demosntrated improved posture, RW control & UE support when RW adjusted after 1st trial where he demonstrated difficulties with the same. He ambulated with improved pace, foot clearance & stride length by conclusion of session. After instructiosn given, pt was also able to negotiate curb steps as noted above without RLE buckling or significant instability. He reports confidence in ability to perform all tasks in preparation for home d/c when medically cleared. pt instructed in importance of mobility, energy conservation & safety awareness during acute phase of healing, to which he verbalized understanding. From PT perspecitve, pt has demosntrated sufficient progress to d/c home with family support & OPPT follow up to progress to PLOF. PT Discharge Recommendation: Home with outpatient rehabilitation    See flowsheet documentation for full assessment.

## 2023-08-28 ENCOUNTER — TELEPHONE (OUTPATIENT)
Dept: OBGYN CLINIC | Facility: HOSPITAL | Age: 78
End: 2023-08-28

## 2023-08-28 ENCOUNTER — OFFICE VISIT (OUTPATIENT)
Dept: PHYSICAL THERAPY | Facility: CLINIC | Age: 78
End: 2023-08-28
Payer: COMMERCIAL

## 2023-08-28 DIAGNOSIS — Z96.651 STATUS POST RIGHT KNEE REPLACEMENT: ICD-10-CM

## 2023-08-28 DIAGNOSIS — M17.11 PRIMARY OSTEOARTHRITIS OF RIGHT KNEE: Primary | ICD-10-CM

## 2023-08-28 PROCEDURE — 97164 PT RE-EVAL EST PLAN CARE: CPT | Performed by: PHYSICAL THERAPIST

## 2023-08-28 PROCEDURE — 97140 MANUAL THERAPY 1/> REGIONS: CPT | Performed by: PHYSICAL THERAPIST

## 2023-08-28 PROCEDURE — 97110 THERAPEUTIC EXERCISES: CPT | Performed by: PHYSICAL THERAPIST

## 2023-08-28 NOTE — PROGRESS NOTES
Physical Therapy Evaluation    Today's date: 2023  Patient name: Shankar Quintero  : 1945  MRN: [id number]  Referring provider: Amarjit Srivastava  Dx:   Encounter Diagnosis     ICD-10-CM    1. Primary osteoarthritis of right knee  M17.11       2. Status post right knee replacement  Z96.651            Assessment  Assessment details: Patient is a 78 y.o. male who  presents with pain, swellling, range of motion loss, weakness s/p R TKA 23  He has functional limitations as a result of impairments. Patient would benefit from course of skilled physical therapy to address above listed impairments in an effort to improve function. Understanding of Dx/Px/POC: excellent  Goals  Short Term Goals:  1) Pain : Decrease R knee  pain to 2/10 at worst x 1 continuous week within 4 weeks. 2) AROM: Improve R knee AROM to at least 0-110 degrees for flexion,  to have 0 active degrees extension within 4 weeks. 3) Strength: Improved R LE strength to at least 4/5 within  4-6 weeks. 4) Function: Patient to note greater ease of ambulation subjectively, cane for community ambulation  within 2-3 weeks, no AD within 4-6 weeks. LongTerm Goals:  1) Pain : Eliminate R knee pain  x 1 continuous week within 8-12 weeks. 2) Swelling: Eiminate R swelling within 8-12 weeks. 3) AROM: Improve R AROM to 0-120 egrees within 6-8 weeks. 4) Strength: Improved R LE strength to 5/5 within 8-12 weeks. 5) Function: Normalized gait on level ground, reciprocal gait on stairs, and no reported difficulty with ADLs as they pertain to R knee within 12 weeks. FOTO self rated functional scale score to be at least 63 within 6 weeks (21 @ IE). 6) (I) with HEP within 12 weeks.     Plan  Patient would benefit from: skilled physical therapy  Planned modality interventions: TENS, cryotherapy and thermotherapy: hydrocollator packs  Planned therapy interventions: stretching, strengthening, home exercise program, functional ROM exercises, joint mobilization, neuromuscular re-education, manual therapy, therapeutic exercise and therapeutic activities  Frequency: 2-3x/wk x 8-12 weeks. Subjective:     Subjective Evaluation     History of Present Illness  Mechanism of injury: Patient is a 78 y.o. old male who presents for an initial outpatient physical therapy evaluation regarding his R knee. PMH significant for R ankle ORIF. Reports insidious onset of R knee pain around April 2023. X-rays (+) for severe lateral knee OA. Steroidal injection via Dr. Katie Pete at that time, received a second injection about three weeks later. Neither injection helped much. With pervasive pain with ADLs, decided to undergo TKA. Has one pre-operative consultation here, surgery was on 8/24/23. One night in hospital, then home with his wife. Current c/o pain, swelling, some difficulty walking/getting around. He is unable to drive at this time. Pain level has been around 5/10 consistently since he left the hospital.  Has been sitting in recliner, watching TV, getting up about once an hour to move around. Using oxycodone for pain control. Patient Goals  Patient goal: decrease pain, improve ROM, improve swelling, to be able to walk with greater ease, to be able to return to regular exercise program.  Steps to enter house: yes (x2)  Lives in: multiple-level home (has one story step for exercise)  Lives with: spouse     Exercise history: normally spends about 1.5 hours of exercise 3 days a week.            Objective         Knee   R knee: Surgical incision site sealed with staples. Wears ace wrap. No drainage.     Provided dressing change and tubigrip wrap for him to slide off and on.     Active Range of Motion   Left Knee (as tested previously)  Flexion: 130 degrees   Extension: 0 degrees     Right knee:   Patellar mobility limited in all directions     Right Knee   Flexion: 75  degrees with pain  Extension: -3 degrees with pain    Passive Range of Motion  Flexion: 85 degrees with pain  Extension: -1 degrees with pain    Has poor R quadriceps set vs strong L quadriceps set. Unable to perform SLR flexion on R      R ankle DF/PF ROM WNL with 5/5 strength is NMB    Ryan's sign is (-) bilaterally  Calf squeeze is (-) bilaterally    Gait: Front wheeled walker, slow brittni. Lacks R knee flexion at preswing/terminal stance, lacks terminal R knee extension.        Precautions: none      Manuals 8/28/23            Patellar PROM (all direction) RG            PROM R knee flexion RG            PROM R knee extension RG                         Neuro Re-Ed                                                                                                        Ther Ex             Quad set 10sec x 20            Heel Slide 5sec x 10            SAQ 3sec  2 x 10            Long sit HS/gastroc stretch 30sec x 3            Ankle pumps X 20            LAQ NV            Mini squat at rail NV            Self seated R knee flexion stretch using L leg to push on R NV            Ther Activity                                       Gait Training                                       Modalities             Cryo R knee 15 min            RVL RG

## 2023-08-28 NOTE — TELEPHONE ENCOUNTER
Patient contacted for a postoperative follow up assessment. Patient reports "pressure against my knee when I am walking." Patient states current pain level of a  0-2/10  when sitting and 5/10 when walking with RW. Patient states they have minimal pain and it is relieved with medication regimen. Patient denies increase in swelling and dressing is clean, dry and intact. Patient is icing the site regularly. PT 8/28 at 10:15AM.     We reviewed patients AVS medication list. Patient is taking Tylenol 1000mg every 6 hours, Oxycodone 5mg every 4 hours, Lovenox injections daily, Senokot daily. NN educated patient on not exceed 3000mg of Tylenol daily. Patient has not yet had a BM but is passing gas. Patient denies nausea, vomiting, abdominal pain, chest pain, shortness of breath, fever, dizziness and calf pain. Patient confirmed post-op appointment with surgeon on 8/31 at [facility] .Patient does not have any other questions or concerns at this time. Pt was encouraged to call with any questions, concerns or issues.

## 2023-08-30 ENCOUNTER — OFFICE VISIT (OUTPATIENT)
Dept: PHYSICAL THERAPY | Facility: CLINIC | Age: 78
End: 2023-08-30
Payer: COMMERCIAL

## 2023-08-30 DIAGNOSIS — Z96.651 STATUS POST RIGHT KNEE REPLACEMENT: ICD-10-CM

## 2023-08-30 DIAGNOSIS — M17.11 PRIMARY OSTEOARTHRITIS OF RIGHT KNEE: Primary | ICD-10-CM

## 2023-08-30 PROCEDURE — 97110 THERAPEUTIC EXERCISES: CPT

## 2023-08-30 PROCEDURE — 97140 MANUAL THERAPY 1/> REGIONS: CPT

## 2023-08-30 NOTE — PROGRESS NOTES
Daily Note     Today's date: 2023  Patient name: Penny Poole  : 1945  MRN: [id number]  Referring provider: Og Sargent  Dx:   Encounter Diagnosis     ICD-10-CM    1. Primary osteoarthritis of right knee  M17.11       2. Status post right knee replacement  Z96.651                      Subjective: Sleep is quality lately, 6-7 hrs/night. Continued knee pain, 5/10, and swelling. Oxycodone and Tylenol for pain management       Objective: See treatment diary below      Assessment: Diminished heel strike and decreased amb brittni with RW. Fair quad activation noted with sets , and limited ROM with SAQ/LAQ. Good tolerance to EOT self OP to promote knee flexion. Low tolerance to extension PROM. Continued PT would be beneficial to improve function.          Plan: Continue per plan of care.        Precautions: none      Manuals 23           Patellar PROM (all direction) RG MB           PROM R knee flexion RG MB           PROM R knee extension RG MB                        Neuro Re-Ed                                                                                                        Ther Ex             Quad set 10sec x 20 10sec x 20           Heel Slide 5sec x 10 5sec x 10           SAQ 3sec  2 x 10 3sec  2 x 10           Long sit HS/gastroc stretch 30sec x 3 30sec x 3           Ankle pumps X 20 x20           LAQ NV trialed           Mini squat at rail NV NV           Self seated R knee flexion stretch using L leg to push on R NV 10x5"           Ther Activity                                       Gait Training                                       Modalities             Cryo R knee 15 min 12 min           RVL RG

## 2023-08-31 ENCOUNTER — HOSPITAL ENCOUNTER (OUTPATIENT)
Dept: RADIOLOGY | Facility: HOSPITAL | Age: 78
Discharge: HOME | End: 2023-08-31
Attending: ORTHOPAEDIC SURGERY
Payer: COMMERCIAL

## 2023-08-31 ENCOUNTER — OFFICE VISIT (OUTPATIENT)
Dept: OBGYN CLINIC | Facility: HOSPITAL | Age: 78
End: 2023-08-31

## 2023-08-31 VITALS
BODY MASS INDEX: 28.96 KG/M2 | DIASTOLIC BLOOD PRESSURE: 72 MMHG | HEIGHT: 65 IN | HEART RATE: 76 BPM | SYSTOLIC BLOOD PRESSURE: 147 MMHG

## 2023-08-31 DIAGNOSIS — Z96.651 S/P TOTAL KNEE REPLACEMENT, RIGHT: Primary | ICD-10-CM

## 2023-08-31 DIAGNOSIS — Z96.651 AFTERCARE FOLLOWING RIGHT KNEE JOINT REPLACEMENT SURGERY: ICD-10-CM

## 2023-08-31 DIAGNOSIS — Z47.1 AFTERCARE FOLLOWING RIGHT KNEE JOINT REPLACEMENT SURGERY: ICD-10-CM

## 2023-08-31 DIAGNOSIS — M17.11 PRIMARY OSTEOARTHRITIS OF RIGHT KNEE: ICD-10-CM

## 2023-08-31 PROCEDURE — 73560 X-RAY EXAM OF KNEE 1 OR 2: CPT

## 2023-08-31 PROCEDURE — 99024 POSTOP FOLLOW-UP VISIT: CPT | Performed by: ORTHOPAEDIC SURGERY

## 2023-08-31 RX ORDER — OXYCODONE HYDROCHLORIDE 5 MG/1
5 TABLET ORAL EVERY 4 HOURS PRN
Qty: 30 TABLET | Refills: 0 | Status: SHIPPED | OUTPATIENT
Start: 2023-08-31 | End: 2023-09-10

## 2023-08-31 NOTE — PROGRESS NOTES
Subjective;    78year-old male, 1 week postop from right total knee replacement. He is observing DVT prophylaxis  He is attending physical therapy  He is accompanied by an adult female at his appointment. X-rays of his knee were obtained on arrival to the office. Past Medical History:   Diagnosis Date   • Colon polyp    • Dermatitis    • Prostate CA [facility])    • Rotator cuff injury     left   • Syncope     6-7 years ago and 10-15 years ago. Past Surgical History:   Procedure Laterality Date   • ANKLE FRACTURE SURGERY     • CHOLECYSTECTOMY     • COLONOSCOPY     • CT ARTHRP KNE CONDYLE&PLATU MEDIAL&LAT COMPARTMENTS Right 8/24/2023    Procedure: ARTHROPLASTY KNEE TOTAL W ROBOT;  Surgeon: Eliezer Sifuentes MD;  Location: [facility];  Service: Orthopedics   • PROSTATE CANCER GENE 3(PCA3) (HISTORICAL)  04/2019     with radiation       Family History   Problem Relation Age of Onset   • Colon cancer Mother    • Diabetes Father        Social History     Tobacco Use   • Smoking status: Former     Packs/day: 0.25     Years: 1.00     Total pack years: 0.25     Types: Cigarettes   • Smokeless tobacco: Never   • Tobacco comments:     Quit smoking in 1970   Vaping Use   • Vaping Use: Never used   Substance Use Topics   • Alcohol use: Yes     Alcohol/week: 7.0 standard drinks of alcohol     Types: 7 Glasses of wine per week   • Drug use: Never     Exam;    Adult male in no acute distress  Appearance and color of his right knee and right calf are appropriate. He has generalized swelling, of his right knee right calf foot and ankle,-expectantly so. He has no calf pain to palpation negative Homans test  Has an extension lag of 8 to 10 degrees and flexion to 85    X-rays; AP and lateral right knee show total knee components in excellent position. Impression; First postoperative follow-up visit for right total knee replacement. Plan;     He was given a joint recipient identification card  He will continue PT  He continues DVT prophylaxis  We will see him next week for proposed staple removal.    Experience was supervised by and plan formulated by the attending surgeon it was my privilege to assist him in delivery of this man's care

## 2023-09-01 ENCOUNTER — OFFICE VISIT (OUTPATIENT)
Dept: PHYSICAL THERAPY | Facility: CLINIC | Age: 78
End: 2023-09-01
Payer: COMMERCIAL

## 2023-09-01 DIAGNOSIS — M17.11 PRIMARY OSTEOARTHRITIS OF RIGHT KNEE: Primary | ICD-10-CM

## 2023-09-01 DIAGNOSIS — Z96.651 STATUS POST RIGHT KNEE REPLACEMENT: ICD-10-CM

## 2023-09-01 PROCEDURE — 97110 THERAPEUTIC EXERCISES: CPT | Performed by: PHYSICAL THERAPIST

## 2023-09-01 PROCEDURE — 97140 MANUAL THERAPY 1/> REGIONS: CPT | Performed by: PHYSICAL THERAPIST

## 2023-09-01 NOTE — PROGRESS NOTES
Daily Note     Today's date: 2023  Patient name: Wilder Faye  : 1945  MRN: [id number]  Referring provider: Va Quezada  Dx:   Encounter Diagnosis     ICD-10-CM    1. Primary osteoarthritis of right knee  M17.11       2. Status post right knee replacement  Z96.651                      Subjective: Since last visit: Had surgical follow up. X-rays looked good. Will return next week for staple removal. Using oxycodone and Tylenol for pain control. Getting around with wheeled walker. Typically goes to bed aroud 1030, will get up to the bathroom around 2 AM, then has a hard time getting back to sleep. Has not returned to driving. Objective: See treatment diary below. AAROM R knee flexion :95 degrees  PROM R knee: 100 degrees  Lacks 10 degrees extension before manuals, has full extension after. R quadriceps contraction still inhibited, but improving. Assessment: Lexx is 1 week, 1 day postop. Making appropriate progress. Advised in heel prop for use at home to help better maintain extension. Plan: Continue per plan of care.       Precautions: none      Manuals 23          Patellar PROM (all direction) RG MB RG          PROM R knee flexion RG MB RG          PROM R knee extension RG MB RG                       Neuro Re-Ed                                                                                                        Ther Ex             Quad set 10sec x 20 10sec x 20 10sec x 20 with towel crush          Heel Slide 5sec x 10 5sec x 10 With strap  Sec  2 x 10          SAQ 3sec  2 x 10 3sec  2 x 10 3sec  3 x 10          Long sit HS/gastroc stretch 30sec x 3 30sec x 3 30sec x 3          Ankle pumps X 20 x20 HEP          LAQ NV trialed X 20          Mini squat at rail NV NV NV          Self seated R knee flexion stretch using L leg to push on R NV 10x5" 10sec x 10          Ther Activity                                       Gait Training Modalities             Cryo R knee 15 min 12 min 10 min          RVL RG

## 2023-09-05 ENCOUNTER — OFFICE VISIT (OUTPATIENT)
Dept: PHYSICAL THERAPY | Facility: CLINIC | Age: 78
End: 2023-09-05
Payer: COMMERCIAL

## 2023-09-05 DIAGNOSIS — Z96.651 STATUS POST RIGHT KNEE REPLACEMENT: ICD-10-CM

## 2023-09-05 DIAGNOSIS — M17.11 PRIMARY OSTEOARTHRITIS OF RIGHT KNEE: Primary | ICD-10-CM

## 2023-09-05 PROCEDURE — 97140 MANUAL THERAPY 1/> REGIONS: CPT | Performed by: PHYSICAL THERAPIST

## 2023-09-05 PROCEDURE — 97110 THERAPEUTIC EXERCISES: CPT | Performed by: PHYSICAL THERAPIST

## 2023-09-05 NOTE — PROGRESS NOTES
Daily Note     Today's date: 2023  Patient name: Romero Montez  : 1945  MRN: [id number]  Referring provider: Dereck Hollingsworth PA-C  Dx:   Encounter Diagnosis     ICD-10-CM    1. Primary osteoarthritis of right knee  M17.11       2. Status post right knee replacement  Z96.651                      Subjective:  R knee pain improving, he is finding he can walk short distances without walker. Having difficulty sleeping with combination knee pain and having to get a up to get to the bathroom. Objective: See treatment diary below. PROM R knee flexion: 105 degrees  Lacks 5 degrees extension before manuals, lacks 1 degree after manuals    Reviewed use of heel prop at home to facilitate extension. Instructed in use of single point cane for ambulation. Assessment: One week, 5 days postop. Making progress. Still has impairments in ROM loss, weakness and swelling all contributing negatively to quality of life and function. Patient would benefit from continued course of skilled physical therapy to address impairments in an effort to improve function. Plan: Continue per plan of care.       Precautions: none      Manuals 23 9         Patellar PROM (all direction) RG MB RG RG         PROM R knee flexion RG MB RG RG         PROM R knee extension RG MB RG RG                      Neuro Re-Ed                                                                                                        Ther Ex             Quad set 10sec x 20 10sec x 20 10sec x 20 with towel crush 10sec x 20 with heel prop         Heel Slide 5sec x 10 5sec x 10 With strap  Sec  2 x 10 With strap  2 x 10         SAQ 3sec  2 x 10 3sec  2 x 10 3sec  3 x 10 3sec  3 x 10         Long sit HS/gastroc stretch 30sec x 3 30sec x 3 30sec x 3 30sec x 3         Ankle pumps X 20 x20 HEP          LAQ NV trialed X 20 2 x 10         Mini squat at rail NV NV NV 2 x 10         Self seated R knee flexion stretch using L leg to push on R NV 10x5" 10sec x 10 10sec  2 x 10         Tband HS curl    [place]  2 x 10         Ther Activity                                       Gait Training             SPC, 3, then 2 point gait    RG                      Modalities             Cryo R knee 15 min 12 min 10 min 10 min         RVL RG

## 2023-09-07 ENCOUNTER — OFFICE VISIT (OUTPATIENT)
Dept: OBGYN CLINIC | Facility: HOSPITAL | Age: 78
End: 2023-09-07

## 2023-09-07 VITALS
HEART RATE: 83 BPM | SYSTOLIC BLOOD PRESSURE: 161 MMHG | BODY MASS INDEX: 28.96 KG/M2 | DIASTOLIC BLOOD PRESSURE: 75 MMHG | HEIGHT: 65 IN

## 2023-09-07 DIAGNOSIS — Z96.651 AFTERCARE FOLLOWING RIGHT KNEE JOINT REPLACEMENT SURGERY: Primary | ICD-10-CM

## 2023-09-07 DIAGNOSIS — Z47.1 AFTERCARE FOLLOWING RIGHT KNEE JOINT REPLACEMENT SURGERY: Primary | ICD-10-CM

## 2023-09-07 PROCEDURE — 99024 POSTOP FOLLOW-UP VISIT: CPT | Performed by: ORTHOPAEDIC SURGERY

## 2023-09-07 NOTE — PROGRESS NOTES
Assessment:   Diagnosis ICD-10-CM Associated Orders   1. Aftercare following right knee joint replacement surgery  Z47.1     Z96.651           Plan:  • 2nd post-op visit 2 weeks s/p right total knee arthroplasty  • His incision is healed, staples removed  • He may get his incision wet, do not submerge, pat dry  • Continue PT and HEP  • Weight bearing and activities as tolerated  • Complete lovenox as instructed  • Ice and elevate as indicated    To do next visit:  Return in about 4 weeks (around 10/5/2023) for re-check. The above stated was discussed in layman's terms and the patient expressed understanding. All questions were answered to the patient's satisfaction. Scribe Attestation    I,:  Veronica Feliz am acting as a scribe while in the presence of the attending physician.:       I,:  Vesta Ferraro MD personally performed the services described in this documentation    as scribed in my presence.:             Subjective:   Jose Pope is a 78 y.o. male who presents today 2nd post-op visit 2 weeks s/p right TKA. He presents using a SPC. Notes swelling. Denies any calf pain. He takes oxycodone and tylenol for pain management  He has been administering his lovenox. Review of systems negative unless otherwise specified in HPI  Review of Systems    Past Medical History:   Diagnosis Date   • Colon polyp    • Dermatitis    • Prostate CA [facility])    • Rotator cuff injury     left   • Syncope     6-7 years ago and 10-15 years ago.        Past Surgical History:   Procedure Laterality Date   • ANKLE FRACTURE SURGERY     • CHOLECYSTECTOMY     • COLONOSCOPY     • NM ARTHRP KNE CONDYLE&PLATU MEDIAL&LAT COMPARTMENTS Right 8/24/2023    Procedure: ARTHROPLASTY KNEE TOTAL W ROBOT;  Surgeon: Vesta Ferraro MD;  Location: [facility];  Service: Orthopedics   • PROSTATE CANCER GENE 3(PCA3) (HISTORICAL)  04/2019     with radiation       Family History   Problem Relation Age of Onset   • Colon cancer Mother    • Diabetes Father        Social History     Occupational History   • Not on file   Tobacco Use   • Smoking status: Former     Packs/day: 0.25     Years: 1.00     Total pack years: 0.25     Types: Cigarettes   • Smokeless tobacco: Never   • Tobacco comments:     Quit smoking in 1970   Vaping Use   • Vaping Use: Never used   Substance and Sexual Activity   • Alcohol use: Yes     Alcohol/week: 7.0 standard drinks of alcohol     Types: 7 Glasses of wine per week   • Drug use: Never   • Sexual activity: Not on file         Current Outpatient Medications:   •  enoxaparin (LOVENOX) 40 mg/0.4 mL, Inject 0.4 mL (40 mg total) under the skin daily for 28 days Start injections after surgery, Disp: 11.2 mL, Rfl: 0  •  Multiple Vitamins-Minerals (MULTIVITAMIN WITH MINERALS) tablet, Take 1 tablet by mouth daily. , Disp: , Rfl:   •  oxyCODONE (Roxicodone) 5 immediate release tablet, Take 1 tablet (5 mg total) by mouth every 4 (four) hours as needed for moderate pain for up to 10 days Max Daily Amount: 30 mg, Disp: 30 tablet, Rfl: 0    No Known Allergies         Vitals:    09/07/23 1233   BP: 161/75   Pulse: 83       Objective:                    Right Knee Exam     Range of Motion   Extension: 10 (within 5 degrees after a few minutes of propping up his heel)   Flexion: 90     Other   Erythema: absent  Right knee swelling: modest throughout. Comments:    Intact extensor mechanism  Healed anterior incision with staples in place which were removed successfully. Appropriate amount of warmth  No signs of infection  Calf and thigh are soft and non-tender, no signs of DVT            Diagnostics, reviewed and taken today if performed as documented:    None performed          Procedures, if performed today:    Procedures    None performed      Portions of the record may have been created with voice recognition software.   Occasional wrong word or "sound a like" substitutions may have occurred due to the inherent limitations of voice recognition software. Read the chart carefully and recognize, using context, where substitutions have occurred.

## 2023-09-08 ENCOUNTER — OFFICE VISIT (OUTPATIENT)
Dept: PHYSICAL THERAPY | Facility: CLINIC | Age: 78
End: 2023-09-08
Payer: COMMERCIAL

## 2023-09-08 DIAGNOSIS — Z96.651 STATUS POST RIGHT KNEE REPLACEMENT: ICD-10-CM

## 2023-09-08 DIAGNOSIS — M17.11 PRIMARY OSTEOARTHRITIS OF RIGHT KNEE: Primary | ICD-10-CM

## 2023-09-08 PROCEDURE — 97140 MANUAL THERAPY 1/> REGIONS: CPT

## 2023-09-08 PROCEDURE — 97110 THERAPEUTIC EXERCISES: CPT

## 2023-09-08 NOTE — PROGRESS NOTES
Daily Note     Today's date: 2023  Patient name: Shankar Quintero  : 1945  MRN: [id number]  Referring provider: Shelly Pandey PA-C  Dx:   Encounter Diagnosis     ICD-10-CM    1. Primary osteoarthritis of right knee  M17.11       2. Status post right knee replacement  Z96.651                      Subjective: Feeling better after having staples removed yesterday. Objective: See treatment diary below. Assessment: Introduced AAROM on bike and leg press without issue. Improved sequencing and confidence with SPC. PROM 4-105*. Patient would benefit from continued course of skilled physical therapy to address impairments in an effort to improve function. Plan: Continue per plan of care.       Precautions: none      Manuals 23        Patellar PROM (all direction) RG MB RG RG CM        PROM R knee flexion RG MB RG RG CM        PROM R knee extension RG MB RG RG CM                     Neuro Re-Ed                                                                                                        Ther Ex             bike     AAROM   5 mins         Quad set 10sec x 20 10sec x 20 10sec x 20 with towel crush 10sec x 20 with heel prop 10sec x20 with heel prop        Heel Slide 5sec x 10 5sec x 10 With strap  Sec  2 x 10 With strap  2 x 10 With strap 2x10        SAQ 3sec  2 x 10 3sec  2 x 10 3sec  3 x 10 3sec  3 x 10 3 sec  3x10        Long sit HS/gastroc stretch 30sec x 3 30sec x 3 30sec x 3 30sec x 3 30sec  x3         Ankle pumps X 20 x20 HEP          LAQ NV trialed X 20 2 x 10 2x10        Mini squat at rail NV NV NV 2 x 10 2x10        Self seated R knee flexion stretch using L leg to push on R NV 10x5" 10sec x 10 10sec  2 x 10 10sec  2x10        Leg press  Seat 7     55#  3x10        Tband HS curl    [place]  2 x 10 Orange  2x10        Ther Activity                                       Gait Training              SPC, 3, then 2 point gait    RG CM Modalities     9/8        Cryo R knee 15 min 12 min 10 min 10 min 10 min         RVL RG

## 2023-09-11 ENCOUNTER — OFFICE VISIT (OUTPATIENT)
Dept: PHYSICAL THERAPY | Facility: CLINIC | Age: 78
End: 2023-09-11
Payer: COMMERCIAL

## 2023-09-11 DIAGNOSIS — M17.11 PRIMARY OSTEOARTHRITIS OF RIGHT KNEE: Primary | ICD-10-CM

## 2023-09-11 DIAGNOSIS — Z96.651 STATUS POST RIGHT KNEE REPLACEMENT: ICD-10-CM

## 2023-09-11 PROCEDURE — 97140 MANUAL THERAPY 1/> REGIONS: CPT | Performed by: PHYSICAL THERAPIST

## 2023-09-11 PROCEDURE — 97110 THERAPEUTIC EXERCISES: CPT | Performed by: PHYSICAL THERAPIST

## 2023-09-11 NOTE — PROGRESS NOTES
Daily Note     Today's date: 2023  Patient name: Juanita Rodriguez  : 1945  MRN: [id number]  Referring provider: Deep Feliz  Dx:   Encounter Diagnosis     ICD-10-CM    1. Primary osteoarthritis of right knee  M17.11       2. Status post right knee replacement  Z96.651           Start Time: 1101  Stop Time: 1157  Total time in clinic (min): 56 minutes    Subjective: Feels swelling has improved, getting spending more time getting around home without use of cane. Feels his motion is improving. Objective: See treatment diary below. Still lacking 5-8 degrees extension. Improves with PROM, but not maintaining. Flexion coming along well (111 degrees AAROM)      Assessment: Continues to make progress approaching three weeks post op, biggest issue now is maintaining extension. Provided updated handout/review for patient to make sure he incorporates heel prop, prone hangs and posterior knee stretching into HEP. There may be a role for extensionator device if this continues to be a problem for him. Plan: Continue per plan of care.       Precautions: none      Manuals 23       Patellar PROM (all direction) RG MB RG RG CM RG       PROM R knee flexion RG MB RG RG CM RG       PROM R knee extension RG MB RG RG CM RG                    Neuro Re-Ed                                                                                                        Ther Ex             bike     AAROM   5 mins  Reverse circles x 5 min       Quad set 10sec x 20 10sec x 20 10sec x 20 with towel crush 10sec x 20 with heel prop 10sec x20 with heel prop 5sec x 2 with heel prop       Heel Slide 5sec x 10 5sec x 10 With strap  Sec  2 x 10 With strap  2 x 10 With strap 2x10 With strap  5sec  2 x 10       SAQ 3sec  2 x 10 3sec  2 x 10 3sec  3 x 10 3sec  3 x 10 3 sec  3x10 3se x 30       Long sit HS/gastroc stretch 30sec x 3 30sec x 3 30sec x 3 30sec x 3 30sec  x3  30sec x 2       Ankle pumps X 20 x20 HEP          LAQ NV trialed X 20 2 x 10 2x10 3sec x 20       Mini squat at rail NV NV NV 2 x 10 2x10 2 x 15       Self seated R knee flexion stretch using L leg to push on R NV 10x5" 10sec x 10 10sec  2 x 10 10sec  2x10 10sec x 10       Leg press  Seat 7     55#  3x10 St 7  55#  3 x 12       Tband HS curl    [place]  2 x 10 Orange  2x10 Orange  2 x 10       Ther Activity                                       Gait Training     9/8         SPC, 3, then 2 point gait    RG CM                     Modalities     9/8        Cryo R knee 15 min 12 min 10 min 10 min 10 min  10 min       RVL RG

## 2023-09-13 ENCOUNTER — OFFICE VISIT (OUTPATIENT)
Dept: PHYSICAL THERAPY | Facility: CLINIC | Age: 78
End: 2023-09-13
Payer: COMMERCIAL

## 2023-09-13 DIAGNOSIS — Z96.651 STATUS POST RIGHT KNEE REPLACEMENT: ICD-10-CM

## 2023-09-13 DIAGNOSIS — M17.11 PRIMARY OSTEOARTHRITIS OF RIGHT KNEE: Primary | ICD-10-CM

## 2023-09-13 PROCEDURE — 97140 MANUAL THERAPY 1/> REGIONS: CPT | Performed by: PHYSICAL THERAPIST

## 2023-09-13 PROCEDURE — 97110 THERAPEUTIC EXERCISES: CPT | Performed by: PHYSICAL THERAPIST

## 2023-09-13 NOTE — PROGRESS NOTES
Progress  Note     Today's date: 2023  Patient name: Liza Obrien  : 1945  MRN: [id number]  Referring provider: Shon Villar  Dx:   Encounter Diagnosis     ICD-10-CM    1. Primary osteoarthritis of right knee  M17.11       2. Status post right knee replacement  Z96.651         Assessment  Assessment details: Patient is a 78 y.o. male who  presents with pain, swellling, range of motion loss, weakness s/p R TKA 23  Since starting therapy pt has made the following progress towards goals:  1) Decreased pain  2) Improved range of motion  3) Decreased swelling  4) Improved strength  5_  Improved functional ambulatory ability  Making  Progress, now 2 weeks, 6 days postop  Still with functional limitations as a result of pain, weakness and AROM loss. I am concerned about his lack of extension. Have reviewed HEP to promote this. Would benefit from use of extensionator device for home use, will discuss with his MD.    Recommend continued short course of therapy, see updated goals below. Understanding of Dx/Px/POC: excellent  Goals  Short Term Goals:  1) Pain : Decrease R knee  pain to 2/10 at worst x 1 continuous week within 4 weeks. -not yet met  2) AROM: Improve R knee AROM to at least 0-110 degrees for flexion,  to have 0 active degrees extension within 4 weeks. -not yet met  3) Strength: Improved R LE strength to at least 4/5 within  4-6 weeks. -partially met  4) Function: Patient to note greater ease of ambulation subjectively, cane for community ambulation  within 2-3 weeks, no AD within 4-6 weeks. -met    LongTerm Goals:  1) Pain : Eliminate R knee pain  x 1 continuous week within 8-12 weeks. -not yet met  2) Swelling: Eiminate R swelling within 8-12 weeks. -not yet met  3) AROM: Improve R AROM to 0-120 degrees within 6-8 weeks. -not yet met  4) Strength: Improved R LE strength to 5/5 within 8-12 weeks. -not yet met  5) Function: Normalized gait on level ground, reciprocal gait on stairs, and no reported difficulty with ADLs as they pertain to R knee within 12 weeks. -not yet met  FOTO self rated functional scale score to be at least 63 within 6 weeks (21 @ IE). -not yet met  6) (I) with HEP within 12 weeks.  -not yet met    Plan  Patient would benefit from: skilled physical therapy  Planned modality interventions: TENS, cryotherapy and thermotherapy: hydrocollator packs  Planned therapy interventions: stretching, strengthening, home exercise program, functional ROM exercises, joint mobilization, neuromuscular re-education, manual therapy, therapeutic exercise and therapeutic activities  Frequency: 2-3x/wk x 8-12 weeks.     Subjective:      Subjective Evaluation     History of Present Illness  Mechanism of injury: Patient is a 78 y.o. old male who presents for an initial outpatient physical therapy evaluation regarding his R knee. PMH significant for R ankle ORIF. Reports insidious onset of R knee pain around April 2023.   X-rays (+) for severe lateral knee OA.   Steroidal injection via Dr. Mercedes Mustafa at that time, received a second injection about three weeks later.  Neither injection helped much. With pervasive pain with ADLs, decided to undergo TKA. Has one pre-operative consultation here, surgery was on 8/24/23. One night in hospital, then home with his wife.     Current c/o pain, swelling, some difficulty walking/getting around. He is unable to drive at this time. Pain level has been around 5/10 consistently since he left the hospital.  Has been sitting in recliner, watching TV, getting up about once an hour to move around. Using oxycodone for pain control. UPDATED Subjective 9/13/23:   Noting improvement overall in pain level, ability to get around. Feels stiffness in knee. Working on heel props for extension at home. Still having difficulty sleeping with combination knee pain and having to get a up to get to the bathroom.   Has stopped use of assistive device.  Has not returned to driving. Patient Goals: making progress towards personal goals  Patient goal: decrease pain, improve ROM, improve swelling, to be able to walk with greater ease, to be able to return to regular exercise program.    Steps to enter house: yes (x2)  Lives in: multiple-level home (has one story step for exercise)  Lives with: spouse           Objective         Knee   R knee: Surgical incision site healing, staples have been removed. No signs of infection.       Active Range of Motion   Left Knee (as tested previously)  Flexion: 130 degrees   Extension: 0 degrees      Right knee:   Patellar mobility limited in all directions     Right Knee   Flexion: 95 degrees with pain  Extension: -10 degrees with pain     Passive Range of Motion  Flexion: 110  degrees with pain  Extension: -5 degrees with pain     Has fair/improving  R quadriceps set vs strong L quadriceps set. Able to perform SLR flexion on R     R knee extension strength 4/5  R knee flexion strength 4+/5     Ryan's sign is (-) bilaterally  Calf squeeze is (-) bilaterally        Able to make full forward revolutions on bike for first time since starting therapy today. Lacks 10 degrees extension today before manuals, lacks 5 degrees extension after manuals. Gait: Lacks terminal knee extension on R. Non-reciprocal up. down stairs, leads up with L, down with R.     Precautions: none      Manuals 8/28/23 8/30 9/1/23 9/5 9/8 9/11 9/13      Patellar PROM (all direction) RG MB RG RG CM RG RG      PROM R knee flexion RG MB RG RG CM RG RG      PROM R knee extension RG MB RG RG CM RG RG                   Neuro Re-Ed     9/8                                                                                                   Ther Ex     9/8        bike     AAROM   5 mins  Reverse circles x 5 min Reverse  X 5 min  Forward x 1 min      Quad set 10sec x 20 10sec x 20 10sec x 20 with towel crush 10sec x 20 with heel prop 10sec x20 with heel prop 5sec x 2 with heel prop 5sec x 20 with heel prop      Heel Slide 5sec x 10 5sec x 10 With strap  Sec  2 x 10 With strap  2 x 10 With strap 2x10 With strap  5sec  2 x 10       SAQ 3sec  2 x 10 3sec  2 x 10 3sec  3 x 10 3sec  3 x 10 3 sec  3x10 3se x 30 1#  3sec  3 x 10      Long sit HS/gastroc stretch 30sec x 3 30sec x 3 30sec x 3 30sec x 3 30sec  x3  30sec x 2 30sec x 3      Ankle pumps X 20 x20 HEP          LAQ NV trialed X 20 2 x 10 2x10 3sec x 20 3sec x 20      Mini squat at rail NV NV NV 2 x 10 2x10 2 x 15       Self seated R knee flexion stretch using L leg to push on R NV 10x5" 10sec x 10 10sec  2 x 10 10sec  2x10 10sec x 10 10sec x 10      Leg press  Seat 7     55#  3x10 St 7  55#  3 x 12 55#  3 x 12      SL press       25#  2  X 10      Tband HS curl    [place]  2 x 10 Orange  2x10 Orange  2 x 10 Orange  2 x 10      Prone hang, with MH at posterior knee       5min      Step up (R)       4" x 10  6"  X 10      Ther Activity                                       Gait Training     9/8         SPC, 3, then 2 point gait    RG CM                     Modalities     9/8        Cryo R knee 15 min 12 min 10 min 10 min 10 min  10 min       RVL RG

## 2023-09-14 DIAGNOSIS — M25.669 POSTOPERATIVE STIFFNESS OF TOTAL KNEE REPLACEMENT, INITIAL ENCOUNTER: Primary | ICD-10-CM

## 2023-09-14 DIAGNOSIS — T84.89XA POSTOPERATIVE STIFFNESS OF TOTAL KNEE REPLACEMENT, INITIAL ENCOUNTER: Primary | ICD-10-CM

## 2023-09-14 DIAGNOSIS — Z96.659 POSTOPERATIVE STIFFNESS OF TOTAL KNEE REPLACEMENT, INITIAL ENCOUNTER: Primary | ICD-10-CM

## 2023-09-15 ENCOUNTER — OFFICE VISIT (OUTPATIENT)
Dept: PHYSICAL THERAPY | Facility: CLINIC | Age: 78
End: 2023-09-15
Payer: COMMERCIAL

## 2023-09-15 DIAGNOSIS — M17.11 PRIMARY OSTEOARTHRITIS OF RIGHT KNEE: Primary | ICD-10-CM

## 2023-09-15 DIAGNOSIS — Z96.651 STATUS POST RIGHT KNEE REPLACEMENT: ICD-10-CM

## 2023-09-15 PROCEDURE — 97110 THERAPEUTIC EXERCISES: CPT | Performed by: PHYSICAL THERAPIST

## 2023-09-15 PROCEDURE — 97140 MANUAL THERAPY 1/> REGIONS: CPT | Performed by: PHYSICAL THERAPIST

## 2023-09-15 NOTE — PROGRESS NOTES
Daily Note     Today's date: 9/15/2023  Patient name: Wilner Sanchez  : 1945  MRN: [id number]  Referring provider: Jodi Le PA-C  Dx:   Encounter Diagnosis     ICD-10-CM    1. Primary osteoarthritis of right knee  M17.11       2. Status post right knee replacement  Z96.651                      Subjective: Has been working hard on extension exercises at home, feels motion is improving. Objective: See treatment diary below. R knee extension before manuals -8 degrees  R knee extension after manuals -1 degrees    Improved R quadriceps set in more extended range. Still able to complete revolutions forward on bike today. Gait: Lacks terminal extension on R    Assessment: Tolerated treatment well. Good gains in ROM since last visit. Waiting to hear about insurance coverage for knee extensionator for home use. Plan: Continue per plan of care.       Precautions: none      Manuals 8/28/23 8/30 9/1/23 9/5 9/8 9/11 9/13 9/15     Patellar PROM (all direction) RG MB RG RG CM RG RG RG     PROM R knee flexion RG MB RG RG CM RG RG RG     PROM R knee extension RG MB RG RG CM RG RG RG                  Neuro Re-Ed                                                                                                        Ther Ex             bike     AAROM   5 mins  Reverse circles x 5 min Reverse  X 5 min  Forward x 1 min reverse x 5 min  FWD x 3 min     Quad set 10sec x 20 10sec x 20 10sec x 20 with towel crush 10sec x 20 with heel prop 10sec x20 with heel prop 5sec x 2 with heel prop 5sec x 20 with heel prop 10sec x 20 with heel prop     Heel Slide 5sec x 10 5sec x 10 With strap  Sec  2 x 10 With strap  2 x 10 With strap 2x10 With strap  5sec  2 x 10  NP     SAQ 3sec  2 x 10 3sec  2 x 10 3sec  3 x 10 3sec  3 x 10 3 sec  3x10 3se x 30 1#  3sec  3 x 10 1#  3sec  2 x 20     Long sit HS/gastroc stretch 30sec x 3 30sec x 3 30sec x 3 30sec x 3 30sec  x3  30sec x 2 30sec x 3 30sec x 4     Ankle pumps X 20 x20 HEP          LAQ NV trialed X 20 2 x 10 2x10 3sec x 20 3sec x 20 1#  3sec x 20     Mini squat at rail NV NV NV 2 x 10 2x10 2 x 15  2 x 15  3# DB     Self seated R knee flexion stretch using L leg to push on R NV 10x5" 10sec x 10 10sec  2 x 10 10sec  2x10 10sec x 10 10sec x 10      Leg press  Seat 7     55#  3x10 St 7  55#  3 x 12 55#  3 x 12 55# x 12  65#  2 x 10     SL press       25#  2  X 10 25#  3 x 10     Tband HS curl    [place]  2 x 10 Orange  2x10 Orange  2 x 10 Orange  2 x 10 Orange  2 x 15     Prone hang, with MH at posterior knee       5min NP     Step up (R)       4" x 10  6"  X 10 6" x 20     Ther Activity                                       Gait Training     9/8         SPC, 3, then 2 point gait    RG CM                     Modalities     9/8        Cryo R knee 15 min 12 min 10 min 10 min 10 min  10 min  10 min     RVL RG

## 2023-09-19 ENCOUNTER — TELEPHONE (OUTPATIENT)
Age: 78
End: 2023-09-19

## 2023-09-19 ENCOUNTER — OFFICE VISIT (OUTPATIENT)
Dept: PHYSICAL THERAPY | Facility: CLINIC | Age: 78
End: 2023-09-19
Payer: COMMERCIAL

## 2023-09-19 DIAGNOSIS — M17.11 PRIMARY OSTEOARTHRITIS OF RIGHT KNEE: Primary | ICD-10-CM

## 2023-09-19 DIAGNOSIS — Z96.651 STATUS POST RIGHT KNEE REPLACEMENT: ICD-10-CM

## 2023-09-19 PROCEDURE — 97140 MANUAL THERAPY 1/> REGIONS: CPT | Performed by: PHYSICAL THERAPIST

## 2023-09-19 PROCEDURE — 97110 THERAPEUTIC EXERCISES: CPT | Performed by: PHYSICAL THERAPIST

## 2023-09-19 NOTE — PROGRESS NOTES
Daily Note     Today's date: 2023  Patient name: Carmine Hodge  : 1945  MRN: [id number]  Referring provider: Augustine Hernandez PA-C  Dx:   Encounter Diagnosis     ICD-10-CM    1. Primary osteoarthritis of right knee  M17.11       2. Status post right knee replacement  Z96.651                      Subjective: [place] has resumed driving. Continues to work on [employer] with a focus on range of motion. Knee pain led to some interrupted sleep last night. Can feel pressure around knee cap. Objective: See treatment diary below. Lacks about 6 degrees extension prior to manuals, about 2 degrees after manuals. AAROM R knee flexion: 110 degrees. Gait: Lacks terminal R knee extension. Assessment: Tolerated progressionswell. Patient continues to make progress approaching four weeks postop. Patient would benefit from continued course of skilled physical therapy to address impairments in an effort to improve function. Plan: Continue per plan of care.       Precautions: none      Manuals 8/28/23 8/30 9/1/23 9/5 9/8 9/11 9/13 9/15 9/19    Patellar PROM (all direction) RG MB RG RG CM RG RG RG RG    PROM R knee flexion RG MB RG RG CM RG RG RG RG    PROM R knee extension RG MB RG RG CM RG RG RG RG                 Neuro Re-Ed                                                                                                        Ther Ex             bike     AAROM   5 mins  Reverse circles x 5 min Reverse  X 5 min  Forward x 1 min reverse x 5 min  FWD x 3 min FWD x 6 min timer    Quad set 10sec x 20 10sec x 20 10sec x 20 with towel crush 10sec x 20 with heel prop 10sec x20 with heel prop 5sec x 2 with heel prop 5sec x 20 with heel prop 10sec x 20 with heel prop NP    Heel Slide 5sec x 10 5sec x 10 With strap  Sec  2 x 10 With strap  2 x 10 With strap 2x10 With strap  5sec  2 x 10  NP     SAQ 3sec  2 x 10 3sec  2 x 10 3sec  3 x 10 3sec  3 x 10 3 sec  3x10 3se x 30 1#  3sec  3 x 10 1#  3sec  2 x 20 1#  3sec x 30    Long sit HS/gastroc stretch 30sec x 3 30sec x 3 30sec x 3 30sec x 3 30sec  x3  30sec x 2 30sec x 3 30sec x 4 30sec x 4    Ankle pumps X 20 x20 HEP          LAQ NV trialed X 20 2 x 10 2x10 3sec x 20 3sec x 20 1#  3sec x 20 13  3sec  20    Mini squat at rail NV NV NV 2 x 10 2x10 2 x 15  2 x 15  3# DB 5#  DB  2 x 15    Self seated R knee flexion stretch using L leg to push on R NV 10x5" 10sec x 10 10sec  2 x 10 10sec  2x10 10sec x 10 10sec x 10      Leg press  Seat 7     55#  3x10 St 7  55#  3 x 12 55#  3 x 12 55# x 12  65#  2 x 10 65#  3 x 10    SL press       25#  2  X 10 25#  3 x 10 25#  3 x 10    Tband HS curl    [place]  2 x 10 Orange  2x10 Orange  2 x 10 Orange  2 x 10 Orange  2 x 15 [place]  2 x 10    Prone hang, with MH at posterior knee       5min NP     [employer] TKE         7.0 5sec x 30    Step up (R)       4" x 10  6"  X 10 6" x 20 6" x 30    Slow march with movement         25' x 2    Ther Activity                                       Gait Training     9/8         SPC, 3, then 2 point gait    RG CM                     Modalities     9/8        Cryo R knee 15 min 12 min 10 min 10 min 10 min  10 min  10 min 10 min    RVL RG

## 2023-09-19 NOTE — TELEPHONE ENCOUNTER
Caller: Param Landers from [facility]    Doctor: Dr. Deep Khan     Reason for call: Piero Harper from [facility] is asking to speak to Moira Client in regard to the [address]. Piero Harper also asking for last OVN to be faxed to number below. I faxed last OVN dated 9/7/23 to number below.     Fax: [phone]    Call back#: [phone]

## 2023-09-21 ENCOUNTER — OFFICE VISIT (OUTPATIENT)
Dept: PHYSICAL THERAPY | Facility: CLINIC | Age: 78
End: 2023-09-21
Payer: COMMERCIAL

## 2023-09-21 DIAGNOSIS — Z96.651 STATUS POST RIGHT KNEE REPLACEMENT: ICD-10-CM

## 2023-09-21 DIAGNOSIS — M17.11 PRIMARY OSTEOARTHRITIS OF RIGHT KNEE: Primary | ICD-10-CM

## 2023-09-21 PROCEDURE — 97140 MANUAL THERAPY 1/> REGIONS: CPT | Performed by: PHYSICAL THERAPIST

## 2023-09-21 PROCEDURE — 97110 THERAPEUTIC EXERCISES: CPT | Performed by: PHYSICAL THERAPIST

## 2023-09-21 NOTE — PROGRESS NOTES
Daily Note     Today's date: 2023  Patient name: Jose Pope  : 1945  MRN: [id number]  Referring provider: Derek Avendaño PA-C  Dx:   Encounter Diagnosis     ICD-10-CM    1. Primary osteoarthritis of right knee  M17.11       2. Status post right knee replacement  Z96.651                      Subjective: Feels swelling is improving. Has the most discomfort first thing out of bed, pain improves as day goes on. Has been working on HEP  Can till feel pressure around knee cap. Objective: See treatment diary below. Lacks about 6 degrees extension prior to manuals, about 2-3 degrees after manuals. Swelling has improved since last visit leading to improved patellar mobility. Discussed use of extensionator device. He is pleased with recent progress and wants to hold off on getting this. Gait: Lacks terminal R knee extension. Assessment: Extension overall better this week, still needs some work to maintain. Patient would benefit from continued course of skilled physical therapy to address impairments in an effort to improve function. Plan: Continue per plan of care.       Precautions: none      Manuals 8/28/23 8/30 9/1/23 9/5 9/8 9/11 9/13 9/15 9/19 9/21   Patellar PROM (all direction) RG MB RG RG CM RG RG RG RG RG   PROM R knee flexion RG MB RG RG CM RG RG RG RG    PROM R knee extension RG MB RG RG CM RG RG RG RG RG                Neuro Re-Ed                                                                                                        Ther Ex             bike     AAROM   5 mins  Reverse circles x 5 min Reverse  X 5 min  Forward x 1 min reverse x 5 min  FWD x 3 min FWD x 6 min timer FWD x 6 min timer   Quad set 10sec x 20 10sec x 20 10sec x 20 with towel crush 10sec x 20 with heel prop 10sec x20 with heel prop 5sec x 2 with heel prop 5sec x 20 with heel prop 10sec x 20 with heel prop NP Heel prop  10sec hold 2 min/2 min   Heel Slide 5sec x 10 5sec x 10 With strap  Sec  2 x 10 With strap  2 x 10 With strap 2x10 With strap  5sec  2 x 10  NP     SAQ 3sec  2 x 10 3sec  2 x 10 3sec  3 x 10 3sec  3 x 10 3 sec  3x10 3se x 30 1#  3sec  3 x 10 1#  3sec  2 x 20 1#  3sec x 30 1.5#  x  30   Long sit HS/gastroc stretch 30sec x 3 30sec x 3 30sec x 3 30sec x 3 30sec  x3  30sec x 2 30sec x 3 30sec x 4 30sec x 4 30sec x 4   Ankle pumps X 20 x20 HEP          LAQ NV trialed X 20 2 x 10 2x10 3sec x 20 3sec x 20 1#  3sec x 20 13  3sec  20 1.5#  X 30   Mini squat at rail NV NV NV 2 x 10 2x10 2 x 15  2 x 15  3# DB 5#  DB  2 x 15 5# DB  2 x15   Self seated R knee flexion stretch using L leg to push on R NV 10x5" 10sec x 10 10sec  2 x 10 10sec  2x10 10sec x 10 10sec x 10      Leg press  Seat 7     55#  3x10 St 7  55#  3 x 12 55#  3 x 12 55# x 12  65#  2 x 10 65#  3 x 10 65#  3 x 10   SL press       25#  2  X 10 25#  3 x 10 25#  3 x 10 25#  3 x 10   Tband HS curl    Vigo  2 x 10 Orange  2x10 Orange  2 x 10 Orange  2 x 10 Orange  2 x 15 Green  2 x 10 Green  2 x 15   Prone hang, with MH at posterior knee       5min NP     [employer] CC TKE         7.0 5sec x 30 7.5  5sec x 30   Step up (R)       4" x 10  6"  X 10 6" x 20 6" x 30 6" x 20   Slow march with movement         25' x 2 25' x 2   SLR flexion          0#  3 x 10   Ther Activity                                       Gait Training     9/8         SPC, 3, then 2 point gait    RG CM        Focus on terminal extension, heel first initial contact          50' x 2   Modalities     9/8        Cryo R knee 15 min 12 min 10 min 10 min 10 min  10 min  10 min 10 min 10 min wit heel prop   RVL RG Complex Repair And Epidermal Autograft Text: The defect edges were debeveled with a #15 scalpel blade.  The primary defect was closed partially with a complex linear closure.  Given the location of the defect, shape of the defect and the proximity to free margins an epidermal autograft was deemed most appropriate to repair the remaining defect.  The graft was trimmed to fit the size of the remaining defect.  The graft was then placed in the primary defect, oriented appropriately, and sutured into place.

## 2023-09-25 ENCOUNTER — OFFICE VISIT (OUTPATIENT)
Dept: PHYSICAL THERAPY | Facility: CLINIC | Age: 78
End: 2023-09-25
Payer: COMMERCIAL

## 2023-09-25 DIAGNOSIS — M17.11 PRIMARY OSTEOARTHRITIS OF RIGHT KNEE: Primary | ICD-10-CM

## 2023-09-25 DIAGNOSIS — Z96.651 STATUS POST RIGHT KNEE REPLACEMENT: ICD-10-CM

## 2023-09-25 PROCEDURE — 97110 THERAPEUTIC EXERCISES: CPT | Performed by: PHYSICAL THERAPIST

## 2023-09-25 PROCEDURE — 97140 MANUAL THERAPY 1/> REGIONS: CPT | Performed by: PHYSICAL THERAPIST

## 2023-09-25 NOTE — PROGRESS NOTES
Daily Note     Today's date: 2023  Patient name: Nargis Perry  : 1945  MRN: [id number]  Referring provider: Selena Grossman PA-C  Dx:   Encounter Diagnosis     ICD-10-CM    1. Primary osteoarthritis of right knee  M17.11       2. Status post right knee replacement  Z96.651                      Subjective: Noting progress, feels he is getting stronger, walking better. Able to drive 3  +hours way way to [address] [employer] this past weekend. Objective: See treatment diary below. Lacks about 5 degrees extension prior to manuals, about 1-2 degrees after manuals. Has pain with passive extension    Progressed LE strengthening program.      Gait: Lacks terminal R knee extension. Assessment: Tolerated progressive strengthening program well. Primary limiting factor now is lack of extension leading to anterior pain gait dysfunction and  Quad inhibition. Patient would benefit from continued course of skilled physical therapy to address impairments in an effort to improve function. Plan: Continue per plan of care.       Precautions: none      Manuals 23            Patellar PROM (all direction) RG            PROM R knee flexion NP            PROM R knee extension RG                         Neuro Re-Ed                                                                                                        Ther Ex             Bike for ROM and strengthening FWD x 6 min timer            Quad set with heel prop 5sec hold x 3 min            SAQ 2#  3sec  X 30            Long sit HS/gastroc stretch 30sec x 4            LAQ 2# x 30            Mini squat at rail 5#DB  2 x 15            Leg press  Seat 7 65#x 12  75#  X 12  85# x 10            SL press 25#  3 x 12            Tband HS curl Green  2 x 15            Kesier CC TKE 8.0  5sec x 30            Step up (R) 6"   x 20            Step up and over 4" x 20            SLR flexion 3sec  2 x10            Low load, long duration R knee extension stretch 3# x 3 min            Ther Activity                                       Gait Training                                       Modalities             Cryo R knee 10 min

## 2023-09-27 ENCOUNTER — OFFICE VISIT (OUTPATIENT)
Dept: PHYSICAL THERAPY | Facility: CLINIC | Age: 78
End: 2023-09-27
Payer: COMMERCIAL

## 2023-09-27 DIAGNOSIS — Z96.651 STATUS POST RIGHT KNEE REPLACEMENT: ICD-10-CM

## 2023-09-27 DIAGNOSIS — M17.11 PRIMARY OSTEOARTHRITIS OF RIGHT KNEE: Primary | ICD-10-CM

## 2023-09-27 PROCEDURE — 97140 MANUAL THERAPY 1/> REGIONS: CPT | Performed by: PHYSICAL THERAPIST

## 2023-09-27 PROCEDURE — 97110 THERAPEUTIC EXERCISES: CPT | Performed by: PHYSICAL THERAPIST

## 2023-09-27 NOTE — PROGRESS NOTES
Daily Note     Today's date: 2023  Patient name: Carmine Hodge  : 1945  MRN: [id number]  Referring provider: Augustine Hernandez PA-C  Dx:   Encounter Diagnosis     ICD-10-CM    1. Primary osteoarthritis of right knee  M17.11       2. Status post right knee replacement  Z96.651                      Subjective: Noting progress, able to go up basement stairs reciprocally, still one a time going down. He asks me if his knee will ever be perfectly straight. Objective: See treatment diary below. Progressed eccentric strengthening on stairs. Lacks about 5 degrees extension prior to manuals, full to table immediately after manuals, but creeps back brooke slight flexion shortly afterward. Has pain with passive extension    Progressed functional LE strengthening program.    Assessment: To answer Lexx's question, advised him the goal is for full extension, and I am still hopeful he will get theres. Primary limiting factor remains lack of extension leading to anterior pain gait dysfunction and quad inhibition. Patient would benefit from continued course of skilled physical therapy to address impairments in an effort to improve function. Reviewed heel prop with ice pack or hot pack on knee to provide more pressure into extension(10 minute intervals)    Plan: Continue per plan of care.       Precautions: none      Manuals 23           Patellar PROM (all direction) RG RG           PROM R knee flexion NP            PROM R knee extension RG RG                        Neuro Re-Ed                                                                                                        Ther Ex             Bike for ROM and strengthening FWD x 6 min timer FWD 6 min   L1           Quad set with heel prop 5sec hold x 3 min            SAQ 2#  3sec  X 30 2#  3sec x 30           Long sit HS/gastroc stretch 30sec x 4 30sec x 4           LAQ 2# x 30 2# x 30           Mini squat at rail 5#DB  2 x 15 D/C Leg press  Seat 7 65#x 12  75#  X 12  85# x 10 85#    1 x 10  2 x 12           SL press 25#  3 x 12 25#  3 x 12           Tband HS curl Green  2 x 15 NP           [employer]esier CC TKE 8.0  5sec x 30 9.0  5sec x 30           Step up (R) 6"   x 20 6" x 20           Step up and over 4" x 20 6" x 10           SLR flexion 3sec  2 x10 3sec  2 x 10           Low load, long duration R knee extension stretch 3# x 3 min 3# x 3 min           Wall squats, pball at back with cane  3sec  1 x 10  1 x 15           Ther Activity                                       Gait Training                                       Modalities             Cryo R knee 10 min            MH R knee  10 min

## 2023-09-29 ENCOUNTER — OFFICE VISIT (OUTPATIENT)
Dept: PHYSICAL THERAPY | Facility: CLINIC | Age: 78
End: 2023-09-29
Payer: COMMERCIAL

## 2023-09-29 DIAGNOSIS — M17.11 PRIMARY OSTEOARTHRITIS OF RIGHT KNEE: Primary | ICD-10-CM

## 2023-09-29 DIAGNOSIS — Z96.651 STATUS POST RIGHT KNEE REPLACEMENT: ICD-10-CM

## 2023-09-29 PROCEDURE — 97110 THERAPEUTIC EXERCISES: CPT | Performed by: PHYSICAL THERAPIST

## 2023-09-29 PROCEDURE — 97140 MANUAL THERAPY 1/> REGIONS: CPT | Performed by: PHYSICAL THERAPIST

## 2023-09-29 NOTE — PROGRESS NOTES
Daily Note     Today's date: 2023  Patient name: Noemy Muhammad  : 1945  MRN: [id number]  Referring provider: Marta Deleon PA-C  Dx:   Encounter Diagnosis     ICD-10-CM    1. Primary osteoarthritis of right knee  M17.11       2. Status post right knee replacement  Z96.651           Start Time: 1152          Subjective: Noting progress. Having less pain. Worked out at home with weights and did some extension stretching with heel prop in chair and with prone hangs this morning. Objective: See treatment diary below. Progressed eccentric strengthening on stairs. Lacks about 5 degrees extension prior to manuals, full extension after manuals including new addition of STM to HS/Gastroc tendons. Has pain with passive extension    Gait improved today, better terminal extension on R    Assessment: Tolerated session well. He can get to full extension, just has not been able to maintain as of yet. Patient would benefit from continued course of skilled physical therapy to address impairments in an effort to improve function. Plan: Continue per plan of care.       Precautions: none      Manuals 23          Patellar PROM (all direction) RG RG RG          PROM R knee flexion NP            PROM R knee extension RG RG RG          STM R HS/Gastroc tendons   RG          Neuro Re-Ed                                                                                                        Ther Ex             Bike for ROM and strengthening FWD x 6 min timer FWD 6 min   L1 FWD  6 min L1          Quad set with heel prop 5sec hold x 3 min  2.5 min, 10 sec hold          SAQ 2#  3sec  X 30 2#  3sec x 30 3#  3sec x 30          Long sit HS/gastroc stretch 30sec x 4 30sec x 4 30sec x 4          LAQ 2# x 30 2# x 30 3# x 30          Mini squat at rail 5#DB  2 x 15 D/C           Leg press  Seat 7 65#x 12  75#  X 12  85# x 10 85#    1 x 10  2 x 12 95#  3 x 10          SL press 25#  3 x 12 25#  3 x 12 35#  3 x 10          Tband HS curl Green  2 x 15 NP           [employer] CC TKE 8.0  5sec x 30 9.0  5sec x 30 9.0  5sec x 30          Step up (R) 6"   x 20 6" x 20           Step up and over 4" x 20 6" x 10 6" x 20          SLR flexion 3sec  2 x10 3sec  2 x 10           Low load, long duration R knee extension stretch 3# x 3 min 3# x 3 min 4# x 3 min          Wall squats, pball at back with cane  3sec  1 x 10  1 x 15 With ball sq.   3sec  2 x 10          Ther Activity                                       Gait Training                                       Modalities             Cryo R knee 10 min            MH R knee  10 min 10 min

## 2023-10-02 ENCOUNTER — OFFICE VISIT (OUTPATIENT)
Dept: PHYSICAL THERAPY | Facility: CLINIC | Age: 78
End: 2023-10-02
Payer: COMMERCIAL

## 2023-10-02 DIAGNOSIS — M17.11 PRIMARY OSTEOARTHRITIS OF RIGHT KNEE: Primary | ICD-10-CM

## 2023-10-02 DIAGNOSIS — Z96.651 STATUS POST RIGHT KNEE REPLACEMENT: ICD-10-CM

## 2023-10-02 PROCEDURE — 97110 THERAPEUTIC EXERCISES: CPT | Performed by: PHYSICAL THERAPIST

## 2023-10-02 PROCEDURE — 97140 MANUAL THERAPY 1/> REGIONS: CPT | Performed by: PHYSICAL THERAPIST

## 2023-10-02 NOTE — PROGRESS NOTES
Progress Note     Today's date: 10/2/2023  Patient name: Romero Montez  : 1945  MRN: [id number]  Referring provider: Ashish Lui  Dx:   Encounter Diagnosis     ICD-10-CM    1. Primary osteoarthritis of right knee  M17.11       2. Status post right knee replacement  Z96.651                Assessment  Assessment details: Patient is a 78 y.o. male  s/p R TKA 23  Over the past month pt has made the following progress towards goals:    1) Decreased pain  2) Improved range of motion  3) Improved strength  4) Improved FOTO self rated functional score (iproved to 55 from 21 @ IE), Improved functional ambulatory ability. Continues to make progress now 5 week, 4 days postop. Has been tough to regain extension, but this is doing better. Still with functional limitations as a result of pain, weakness and AROM loss.       Recommend continued short course of therapy, see updated goals below. Understanding of Dx/Px/POC: excellent  Goals  Short Term Goals:  1) Pain : Decrease R knee  pain to 2/10 at worst x 1 continuous week within 4 weeks. -met  2) AROM: Improve R knee AROM to at least 0-110 degrees for flexion,  to have 0 active degrees extension within 4 weeks. -not yet met  3) Strength: Improved R LE strength to at least 4/5 within  4-6 weeks. -met  4) Function: Patient to note greater ease of ambulation subjectively, cane for community ambulation  within 2-3 weeks, no AD within 4-6 weeks. -met    LongTerm Goals:  1) Pain : Eliminate R knee pain  x 1 continuous week within 8-12 weeks. -not yet met  2) Swelling: Eiminate R swelling within 8-12 weeks. -not yet met  3) AROM: Improve R AROM to 0-120 degrees within 6-8 weeks. -not yet met  4) Strength: Improved R LE strength to 5/5 within 8-12 weeks. -not yet met  5) Function: Normalized gait on level ground, reciprocal gait on stairs, and no reported difficulty with ADLs as they pertain to R knee within 12 weeks. -not yet met  FOTO self rated functional scale score to be at least 63 within 6 weeks (21 @ IE). -not yet met  6) (I) with HEP within 12 weeks.  -not yet met     Plan  Patient would benefit from: skilled physical therapy  Planned modality interventions: TENS, cryotherapy and thermotherapy: hydrocollator packs  Planned therapy interventions: stretching, strengthening, home exercise program, functional ROM exercises, joint mobilization, neuromuscular re-education, manual therapy, therapeutic exercise and therapeutic activities  Frequency: 2-3x/wk x 4 weeks.     Subjective:      Subjective Evaluation     History of Present Illness  Mechanism of injury: Patient is a 78 y.o. old male who presents for an initial outpatient physical therapy evaluation regarding his R knee. PMH significant for R ankle ORIF. Reports insidious onset of R knee pain around April 2023.   X-rays (+) for severe lateral knee OA.   Steroidal injection via Dr. Zoey Garcia at that time, received a second injection about three weeks later.  Neither injection helped much.   With pervasive pain with ADLs, decided to undergo TKA. Has one pre-operative consultation here, surgery was on 8/24/23. One night in hospital, then home with his wife.     Current c/o pain, swelling, some difficulty walking/getting around. He is unable to drive at this time. Pain level has been around 5/10 consistently since he left the hospital.  Has been sitting in recliner, watching TV, getting up about once an hour to move around. Using oxycodone for pain control.           UPDATED Subjective 9/13/23:   Noting improvement overall in pain level, ability to get around. Feels stiffness in knee. Working on heel props for extension at home. Still having difficulty sleeping with combination knee pain and having to get a up to get to the bathroom.   Has stopped use of assistive device. Has not returned to driving. UPDATE 10/2/23:   Overall noting improvement. Has resumed driving. 2/10 pain on average.  Has been worknig hard on home program trying to get knee as straight as possible (extension). Feels strength is improving. Noting improvement going up stairs (can go every other step)  Still going down one step at time.         Patient Goals: making progress towards personal goals  Patient goal: decrease pain, improve ROM, improve swelling, to be able to walk with greater ease, to be able to return to regular exercise program.     Steps to enter house: yes (x2)  Lives in: multiple-level home (has one story step for exercise)  Lives with: spouse           Objective         Knee   R knee: Surgical incision site healed  Has some redness today that is new since last visit, he was using heat earlier today without an extra layer between hot pack and skin, I think this is what redness is from.     Active Range of Motion   Left Knee (as tested previously)  Flexion: 130 degrees   Extension: 0 degrees      Right knee:   Patellar mobility : mild hypomobile moving superiorly.     Right Knee   Flexion: 115 degrees   Extension: -2 degrees      Passive Range of Motion  Flexion: 121  degrees with pain  Extension: 0degrees with pain     Has improved/strong  R quadriceps set   Able to perform SLR flexion on R with mild lag     R knee extension strength /5  R knee flexion strength 5/5    R hip abduction strength: 4+/5     Ryan's sign is (-) bilaterally  Calf squeeze is (-) bilaterally        Gait: At times lacks terminal knee extension on R. Non-reciprocal up. down stairs, down with R.  Precautions: none        Stop Time: 1751          Precautions: none      Manuals 9/25/23 9/27 9/29 10/2         Patellar PROM (all direction) RG RG RG RG         PROM R knee flexion NP            PROM R knee extension RG RG RG RG         STM R HS/Gastroc tendons   RG          Neuro Re-Ed                                                                                                        Ther Ex             Bike for ROM and strengthening FWD x 6 min timer FWD 6 min   L1 FWD  6 min L1 FWD  6 min   L2         Quad set with heel prop 5sec hold x 3 min  2.5 min, 10 sec hold 3 min  10sec hold         SAQ 2#  3sec  X 30 2#  3sec x 30 3#  3sec x 30 3# x 30         Long sit HS/gastroc stretch 30sec x 4 30sec x 4 30sec x 4 30sec x 3          LAQ 2# x 30 2# x 30 3# x 30 3# x 30         Mini squat at rail 5#DB  2 x 15 D/C           Leg press  Seat 7 65#x 12  75#  X 12  85# x 10 85#    1 x 10  2 x 12 95#  3 x 10 95#  3 x 10         SL press 25#  3 x 12 25#  3 x 12 35#  3 x 10 35#  3 x 10         Tband HS curl Green  2 x 15 NP           [employer] CC TKE 8.0  5sec x 30 9.0  5sec x 30 1.0  5sec x 30 10.0  5sec x 30         Step up (R) 6"   x 20 6" x 20           Step up and over 4" x 20 6" x 10 6" x 20 6" x 20         SLR flexion 3sec  2 x10 3sec  2 x 10  3sec  2  15         Low load, long duration R knee extension stretch 3# x 3 min 3# x 3 min 4# x 3 min 4# x 3 min         Wall squats, pball at back with cane  3sec  1 x 10  1 x 15 With ball sq.   3sec  2 x 10 With ball sq  3sec  2 x 10         Ther Activity                                       Gait Training                                       Modalities             Cryo R knee 10 min            MH R knee  10 min 10 min

## 2023-10-04 ENCOUNTER — OFFICE VISIT (OUTPATIENT)
Dept: PHYSICAL THERAPY | Facility: CLINIC | Age: 78
End: 2023-10-04
Payer: COMMERCIAL

## 2023-10-04 DIAGNOSIS — M17.11 PRIMARY OSTEOARTHRITIS OF RIGHT KNEE: Primary | ICD-10-CM

## 2023-10-04 PROCEDURE — 97140 MANUAL THERAPY 1/> REGIONS: CPT | Performed by: PHYSICAL THERAPIST

## 2023-10-04 PROCEDURE — 97110 THERAPEUTIC EXERCISES: CPT | Performed by: PHYSICAL THERAPIST

## 2023-10-04 NOTE — PROGRESS NOTES
Daily Note     Today's date: 10/4/2023  Patient name: Shraddha Knox  : 1945  MRN: [id number]  Referring provider: Denisse Mixon PA-C  Dx:   Encounter Diagnosis     ICD-10-CM    1. Primary osteoarthritis of right knee  M17.11           Start Time: 0800  Stop Time: 912  Total time in clinic (min): 72 minutes    Subjective: Luis A Lombardo feels he is making progress. Feels he is walking better. Objective: See treatment diary below    Able to get to full extension but with more  effort and pain as compared to last visit. . Knee may have been more stiff secondary to early hour of day. Assessment: Able to get to full extension but with more  effort and pain as compared to last visit. . Knee may have been more stiff secondary to early hour of day. Patient would benefit from continued course of skilled physical therapy to address impairments in an effort to improve function. Plan: Continue per plan of care.       Precautions: none      Manuals 9/25/23 9/27 9/29 10/2 10/4        Patellar PROM (all direction) RG RG RG RG RG        PROM R knee flexion NP            PROM R knee extension RG RG RG RG RG        STM R HS/Gastroc tendons   RG  RG        Neuro Re-Ed                                                                                                        Ther Ex             Bike for ROM and strengthening FWD x 6 min timer FWD 6 min   L1 FWD  6 min L1 FWD  6 min   L2 FWD x 7 min  L2        Quad set with heel prop 5sec hold x 3 min  2.5 min, 10 sec hold 3 min  10sec hold 2.5 min  10sec hold        SAQ 2#  3sec  X 30 2#  3sec x 30 3#  3sec x 30 3# x 30 3# x 30        Long sit HS/gastroc stretch 30sec x 4 30sec x 4 30sec x 4 30sec x 3  30sec x 4        LAQ 2# x 30 2# x 30 3# x 30 3# x 30 3#  X 30        Mini squat at rail 5#DB  2 x 15 D/C           Leg press  Seat 7 65#x 12  75#  X 12  85# x 10 85#    1 x 10  2 x 12 95#  3 x 10 95#  3 x 10 95#  3 x 12        SL press 25#  3 x 12 25#  3 x 12 35#  3 x 10 35#  3 x 10 35#  3 x 12        Tband HS curl Green  2 x 15 NP           [employer] CC TKE 8.0  5sec x 30 9.0  5sec x 30 1.0  5sec x 30 10.0  5sec x 30 10.0  5sec x 30        Step up (R) 6"   x 20 6" x 20           Step up and over 4" x 20 6" x 10 6" x 20 6" x 20 6"x  20        SLR flexion 3sec  2 x10 3sec  2 x 10  3sec  2  15 3sec  2 x 15        Low load, long duration R knee extension stretch 3# x 3 min 3# x 3 min 4# x 3 min 4# x 3 min 4# x 3 min        Wall squats, pball at back with cane  3sec  1 x 10  1 x 15 With ball sq. 3sec  2 x 10 With ball sq  3sec  2 x 10 With ball sq.   3sec  2 x 10        Ther Activity                                       Gait Training                                       Modalities             Cryo R knee 10 min            MH R knee  10 min 10 min

## 2023-10-10 ENCOUNTER — OFFICE VISIT (OUTPATIENT)
Dept: PHYSICAL THERAPY | Facility: CLINIC | Age: 78
End: 2023-10-10
Payer: COMMERCIAL

## 2023-10-10 DIAGNOSIS — Z96.651 STATUS POST RIGHT KNEE REPLACEMENT: ICD-10-CM

## 2023-10-10 DIAGNOSIS — M17.11 PRIMARY OSTEOARTHRITIS OF RIGHT KNEE: Primary | ICD-10-CM

## 2023-10-10 PROCEDURE — 97140 MANUAL THERAPY 1/> REGIONS: CPT | Performed by: PHYSICAL THERAPIST

## 2023-10-10 PROCEDURE — 97110 THERAPEUTIC EXERCISES: CPT | Performed by: PHYSICAL THERAPIST

## 2023-10-10 NOTE — PROGRESS NOTES
Daily Note     Today's date: 10/10/2023  Patient name: Wilner Sanchez  : 1945  MRN: [id number]  Referring provider: Jodi Le PA-C  Dx:   Encounter Diagnosis     ICD-10-CM    1. Primary osteoarthritis of right knee  M17.11       2. Status post right knee replacement  Z96.651           Start Time: 1325  Stop Time: 1429  Total time in clinic (min): 64 minutes    Subjective: Was away on vacation last week. Still able to do some knee extension stretches. Worked out at home on bike and with some weights this morning. Feeling better now as compared to immediately preop. Feels heel to toe gait has improved. Having some intermittent pain/pressure around knee cap. Objective: See treatment diary below. Progressed LE strengthening program.    Near full extension to start session, easily gets 0 with more gentle stretching and manuals today. Knee   R knee: Surgical incision site healed    Active Range of Motion   Left Knee (as tested previously)  Flexion: 130 degrees   Extension: 0 degrees      Right knee:   Patellar mobility : mild hypomobile moving superiorly.     Right Knee   Flexion: 120 degrees   Extension: -2 degrees      Passive Range of Motion  Flexion: 125  degrees   Extension: 0 degrees      Has improved/strong  R quadriceps set   Able to perform SLR flexion on R with mild lag     R knee extension strength 4+/5  R knee flexion strength 5/5    R hip abduction strength: 4+/5     Ryan's sign is (-) bilaterally  Calf squeeze is (-) bilaterally        Swelling has decreased since last visit. Gait has improved since last visit. Good heel to toe vs flat foot  mechanics with improved terminal knee extension    Assessment: 6 weeks, 5 days postop. Seems to have turned a corner in his recovery. Now better able to maintain R knee extension. Quad strength improving, still not equal  to L.  Patient would benefit from continued course of skilled physical therapy to address impairments in an effort to improve function. Plan: Continue per plan of care. Precautions: none      Manuals 9/25/23 9/27 9/29 10/2 10/4 10/10       Patellar PROM (all direction) RG RG RG RG RG RG       PROM R knee flexion NP            PROM R knee extension RG RG RG RG RG RG       STM R HS/Gastroc tendons   RG  RG RG       Neuro Re-Ed                                                                                                        Ther Ex             Bike for ROM and strengthening FWD x 6 min timer FWD 6 min   L1 FWD  6 min L1 FWD  6 min   L2 FWD x 7 min  L2 FWD x 6 min       Quad set with heel prop 5sec hold x 3 min  2.5 min, 10 sec hold 3 min  10sec hold 2.5 min  10sec hold 2.5 min  10 sec hold       SAQ 2#  3sec  X 30 2#  3sec x 30 3#  3sec x 30 3# x 30 3# x 30 4# x 30       Long sit HS/gastroc stretch 30sec x 4 30sec x 4 30sec x 4 30sec x 3  30sec x 4 30sec x 4       LAQ 2# x 30 2# x 30 3# x 30 3# x 30 3#  X 30 4# x 30       Mini squat at rail 5#DB  2 x 15 D/C           Leg press  Seat 7 65#x 12  75#  X 12  85# x 10 85#    1 x 10  2 x 12 95#  3 x 10 95#  3 x 10 95#  3 x 12 105#  3 x 10       SL press 25#  3 x 12 25#  3 x 12 35#  3 x 10 35#  3 x 10 35#  3 x 12 35#    3 x 15  45#   x 10       Tband HS curl Green  2 x 15 NP           [employer] CC TKE 8.0  5sec x 30 9.0  5sec x 30 1.0  5sec x 30 10.0  5sec x 30 10.0  5sec x 30 12    5sec x 30       Step up (R) 6"   x 20 6" x 20    8" x 10  No UE assist       Step up and over 4" x 20 6" x 10 6" x 20 6" x 20 6"x  20 6" x 20       SLR flexion 3sec  2 x10 3sec  2 x 10  3sec  2  15 3sec  2 x 15 3sec  2 x 15       Low load, long duration R knee extension stretch 3# x 3 min 3# x 3 min 4# x 3 min 4# x 3 min 4# x 3 min NP       Wall squats, pball at back with cane  3sec  1 x 10  1 x 15 With ball sq. 3sec  2 x 10 With ball sq  3sec  2 x 10 With ball sq.   3sec  2 x 10        Ther Activity                                       Gait Training                                       Modalities Cryo R knee 10 min            MH R knee  10 min 10 min   10 min

## 2023-10-12 ENCOUNTER — OFFICE VISIT (OUTPATIENT)
Dept: OBGYN CLINIC | Facility: HOSPITAL | Age: 78
End: 2023-10-12
Payer: COMMERCIAL

## 2023-10-12 VITALS
HEIGHT: 65 IN | BODY MASS INDEX: 28.49 KG/M2 | HEART RATE: 77 BPM | DIASTOLIC BLOOD PRESSURE: 72 MMHG | WEIGHT: 171 LBS | SYSTOLIC BLOOD PRESSURE: 160 MMHG

## 2023-10-12 DIAGNOSIS — Z96.651 S/P TOTAL KNEE REPLACEMENT, RIGHT: Primary | ICD-10-CM

## 2023-10-12 PROCEDURE — 99212 OFFICE O/P EST SF 10 MIN: CPT | Performed by: ORTHOPAEDIC SURGERY

## 2023-10-12 NOTE — PROGRESS NOTES
Subjective;    57-year-old male several weeks status post right total knee replacement. Walks without an assistant device  He requires no heroic pain medication. He has been doing physical therapy exercise    Presents today for a clinical exam    Past Medical History:   Diagnosis Date    Colon polyp     Dermatitis     Prostate CA ([address])     Rotator cuff injury     left    Syncope     6-7 years ago and 10-15 years ago. Past Surgical History:   Procedure Laterality Date    ANKLE FRACTURE SURGERY      CHOLECYSTECTOMY      COLONOSCOPY      SD ARTHRP KNE CONDYLE&PLATU MEDIAL&LAT COMPARTMENTS Right 8/24/2023    Procedure: ARTHROPLASTY KNEE TOTAL W ROBOT;  Surgeon: Emilio Hyde MD;  Location: [facility];  Service: Orthopedics    PROSTATE CANCER GENE 3(PCA3) (HISTORICAL)  04/2019     with radiation       Family History   Problem Relation Age of Onset    Colon cancer Mother     Diabetes Father        Social History     Tobacco Use    Smoking status: Former     Packs/day: 0.25     Years: 1.00     Total pack years: 0.25     Types: Cigarettes    Smokeless tobacco: Never    Tobacco comments:     Quit smoking in 1970   Vaping Use    Vaping Use: Never used   Substance Use Topics    Alcohol use: Yes     Alcohol/week: 7.0 standard drinks of alcohol     Types: 7 Glasses of wine per week    Drug use: Never     Exam;    Displays excellent color  No pallor. Right knee healed incision anterior  Full extension with effort. Flexion greater than 100 degrees, and no lifting of the right buttock from the chair  Has a soft calf  No palpable calf pain    Impression;    Postop visit right total knee replacement    Plan;    Continue physical therapy exercises  Walk ad lizandro.   Follow-up in 6 weeks with x-rays    His experience was provided by and plan formulated by the attending surgeon it was my privilege to assist him in the delivery of this man's care

## 2023-10-13 ENCOUNTER — OFFICE VISIT (OUTPATIENT)
Dept: PHYSICAL THERAPY | Facility: CLINIC | Age: 78
End: 2023-10-13
Payer: COMMERCIAL

## 2023-10-13 DIAGNOSIS — Z96.651 STATUS POST RIGHT KNEE REPLACEMENT: ICD-10-CM

## 2023-10-13 DIAGNOSIS — M17.11 PRIMARY OSTEOARTHRITIS OF RIGHT KNEE: Primary | ICD-10-CM

## 2023-10-13 PROCEDURE — 97140 MANUAL THERAPY 1/> REGIONS: CPT | Performed by: PHYSICAL THERAPIST

## 2023-10-13 PROCEDURE — 97110 THERAPEUTIC EXERCISES: CPT | Performed by: PHYSICAL THERAPIST

## 2023-10-13 NOTE — PROGRESS NOTES
Daily Note     Today's date: 10/13/2023  Patient name: Tiny Corey  : 1945  MRN: [id number]  Referring provider: Christina Redding PA-C  Dx:   Encounter Diagnosis     ICD-10-CM    1. Primary osteoarthritis of right knee  M17.11       2. Status post right knee replacement  Z96.651                      Subjective: Since last visit Keron Warren followed up with Dr. Katie Pete. Per patient is to continue to try to get knee as flat as possible. Was advised to lower seat on bike at home to promote flexion. Still feels he is making progress. Objective: See treatment diary below. Progressed functional strengthening program.      Assessment: Tolerated treatment well. Patient demonstrated fatigue post treatment, exhibited good technique with therapeutic exercises, and would benefit from continued PT      Plan: Continue per plan of care.       Precautions: none      Manuals 9/25/23 9/27 9/29 10/2 10/4 10/10 10/13      Patellar PROM (all direction) RG RG RG RG RG RG RG      PROM R knee flexion NP      RG      PROM R knee extension RG RG RG RG RG RG RG      STM R HS/Gastroc tendons   RG  RG RG RG      Neuro Re-Ed                                                                                                        Ther Ex             Bike for ROM and strengthening FWD x 6 min timer FWD 6 min   L1 FWD  6 min L1 FWD  6 min   L2 FWD x 7 min  L2 FWD x 6 min FWD x 5 min L2      Quad set with heel prop 5sec hold x 3 min  2.5 min, 10 sec hold 3 min  10sec hold 2.5 min  10sec hold 2.5 min  10 sec hold       SAQ 2#  3sec  X 30 2#  3sec x 30 3#  3sec x 30 3# x 30 3# x 30 4# x 30 4# x 30      Long sit HS/gastroc stretch 30sec x 4 30sec x 4 30sec x 4 30sec x 3  30sec x 4 30sec x 4 30sec x 4      LAQ 2# x 30 2# x 30 3# x 30 3# x 30 3#  X 30 4# x 30 4# x 30      Mini squat at rail 5#DB  2 x 15 D/C           Leg press  Seat 7 65#x 12  75#  X 12  85# x 10 85#    1 x 10  2 x 12 95#  3 x 10 95#  3 x 10 95#  3 x 12 105#  3 x 10 105#  3 x 10 SL press 25#  3 x 12 25#  3 x 12 35#  3 x 10 35#  3 x 10 35#  3 x 12 35#    3 x 15  45#   x 10 45#  3 x 10      Tband HS curl Green  2 x 15 NP     Blue  2 x 15      [employer] CC TKE 8.0  5sec x 30 9.0  5sec x 30 1.0  5sec x 30 10.0  5sec x 30 10.0  5sec x 30 12    5sec x 30 12.5 5sec x 30      Step up (R) 6"   x 20 6" x 20    8" x 10  No UE assist 8" x 10  Up and over x 5      Step up and over 4" x 20 6" x 10 6" x 20 6" x 20 6"x  20 6" x 20 6" x 20      SLR flexion 3sec  2 x10 3sec  2 x 10  3sec  2  15 3sec  2 x 15 3sec  2 x 15 3sec  2 x 15      Low load, long duration R knee extension stretch 3# x 3 min 3# x 3 min 4# x 3 min 4# x 3 min 4# x 3 min NP       Wall squats, pball at back with cane  3sec  1 x 10  1 x 15 With ball sq. 3sec  2 x 10 With ball sq  3sec  2 x 10 With ball sq.   3sec  2 x 10        Slow march with  movement       50' x 2      Tandem walk       50' x 1      Ther Activity                                       Gait Training                                       Modalities             Cryo R knee 10 min            MH R knee  10 min 10 min   10 min 10 min

## 2023-10-23 ENCOUNTER — OFFICE VISIT (OUTPATIENT)
Dept: PHYSICAL THERAPY | Facility: CLINIC | Age: 78
End: 2023-10-23
Payer: COMMERCIAL

## 2023-10-23 DIAGNOSIS — M17.11 PRIMARY OSTEOARTHRITIS OF RIGHT KNEE: Primary | ICD-10-CM

## 2023-10-23 DIAGNOSIS — Z96.651 STATUS POST RIGHT KNEE REPLACEMENT: ICD-10-CM

## 2023-10-23 PROCEDURE — 97110 THERAPEUTIC EXERCISES: CPT | Performed by: PHYSICAL THERAPIST

## 2023-10-23 PROCEDURE — 97140 MANUAL THERAPY 1/> REGIONS: CPT | Performed by: PHYSICAL THERAPIST

## 2023-10-23 NOTE — PROGRESS NOTES
Daily Note     Today's date: 10/23/2023  Patient name: Brooklynn Maxwell  : 1945  MRN: [id number]  Referring provider: Lewis Morfin PA-C  Dx:   Encounter Diagnosis     ICD-10-CM    1. Primary osteoarthritis of right knee  M17.11       2. Status post right knee replacement  Z96.651              Stop Time: 1053       Subjective:  Returns for the first time since 10/13/23. Was away n vacation, able to do some exercises while away. Still some stiffness and soreness, but improving. 2/10 pain on average. Objective: See treatment diary below. Progressed LE strengthening program  Still able to achieve full extension after manuals, but with more effort today vs  time of last visit. Assessment: Tolerated treatment well. Patient exhibited good technique with therapeutic exercises and would benefit from continued PT      Plan: Continue per plan of care.       Precautions: none      Manuals 9/25/23 9/27 9/29 10/2 10/4 10/10 10/13 10/23     Patellar PROM (all direction) RG RG RG RG RG RG RG RG     PROM R knee flexion NP      RG      PROM R knee extension RG RG RG RG RG RG RG RG     STM R HS/Gastroc tendons   RG  RG RG RG RG     Neuro Re-Ed                                                                                                        Ther Ex             Bike for ROM and strengthening FWD x 6 min timer FWD 6 min   L1 FWD  6 min L1 FWD  6 min   L2 FWD x 7 min  L2 FWD x 6 min FWD x 5 min L2 FWD x 5 min L2     Quad set with heel prop 5sec hold x 3 min  2.5 min, 10 sec hold 3 min  10sec hold 2.5 min  10sec hold 2.5 min  10 sec hold  10sec hold x 30     SAQ 2#  3sec  X 30 2#  3sec x 30 3#  3sec x 30 3# x 30 3# x 30 4# x 30 4# x 30 5# x 30     Long sit HS/gastroc stretch 30sec x 4 30sec x 4 30sec x 4 30sec x 3  30sec x 4 30sec x 4 30sec x 4 30sec x 4     LAQ 2# x 30 2# x 30 3# x 30 3# x 30 3#  X 30 4# x 30 4# x 30 5#  x 30     Mini squat at rail 5#DB  2 x 15 D/C           Leg press  Seat 7 65#x 12  75#  X 12  85# x 10 85#    1 x 10  2 x 12 95#  3 x 10 95#  3 x 10 95#  3 x 12 105#  3 x 10 105#  3 x 10 105# x 12  115#  2 x 10     SL press 25#  3 x 12 25#  3 x 12 35#  3 x 10 35#  3 x 10 35#  3 x 12 35#    3 x 15  45#   x 10 45#  3 x 10 45#  3 x 10     Tband HS curl Green  2 x 15 NP     Blue  2 x 15 Blue  2 x 15     [employer]esier CC TKE 8.0  5sec x 30 9.0  5sec x 30 1.0  5sec x 30 10.0  5sec x 30 10.0  5sec x 30 12    5sec x 30 12.5 5sec x 30 12.5  5sec x 30     Step up (R) 6"   x 20 6" x 20    8" x 10  No UE assist 8" x 10  Up and over x 5 8"  x10   Up and over x 10     Step up and over 4" x 20 6" x 10 6" x 20 6" x 20 6"x  20 6" x 20 6" x 20      SLR flexion 3sec  2 x10 3sec  2 x 10  3sec  2  15 3sec  2 x 15 3sec  2 x 15 3sec  2 x 15 3sec  2 x 15  2#     Low load, long duration R knee extension stretch 3# x 3 min 3# x 3 min 4# x 3 min 4# x 3 min 4# x 3 min NP       Wall squats, pball at back with cane  3sec  1 x 10  1 x 15 With ball sq. 3sec  2 x 10 With ball sq  3sec  2 x 10 With ball sq.   3sec  2 x 10        Slow march with  movement       50' x 2 50' x 2     Tandem walk       50' x 1 50' x 2     MH Flexion        40#  2 x 15     Ther Activity                                       Gait Training                                       Modalities             Cryo R knee 10 min            MH R knee  10 min 10 min   10 min 10 min 10 min

## 2023-10-26 ENCOUNTER — OFFICE VISIT (OUTPATIENT)
Dept: PHYSICAL THERAPY | Facility: CLINIC | Age: 78
End: 2023-10-26
Payer: COMMERCIAL

## 2023-10-26 DIAGNOSIS — Z96.651 STATUS POST RIGHT KNEE REPLACEMENT: ICD-10-CM

## 2023-10-26 DIAGNOSIS — M17.11 PRIMARY OSTEOARTHRITIS OF RIGHT KNEE: Primary | ICD-10-CM

## 2023-10-26 PROCEDURE — 97140 MANUAL THERAPY 1/> REGIONS: CPT | Performed by: PHYSICAL THERAPIST

## 2023-10-26 PROCEDURE — 97110 THERAPEUTIC EXERCISES: CPT | Performed by: PHYSICAL THERAPIST

## 2023-10-26 NOTE — PROGRESS NOTES
Daily Note     Today's date: 10/26/2023  Patient name: Herbert Andrea  : 1945  MRN: [id number]  Referring provider: Carolyn Monson PA-C  Dx:   Encounter Diagnosis     ICD-10-CM    1. Primary osteoarthritis of right knee  M17.11       2. Status post right knee replacement  Z96.651                      Subjective:  " I definitely can feel that my knee is improving." Has been doing HEP. Feels strength and motion improving      Objective: See treatment diary below. Progressed LE strengthening program  Still able to achieve full extension after manuals with only mild  effort . AAROM R knee flexion: 125 degrees    Progressed LE strengthening program.    Assessment: Flexion ROM excellent, extension ROM full, but still takes time and effort to get there. Functional strength is improving. Functionally, still lacks terminal extension on with walking. Plan: Continue per plan of care.       Precautions: none      Manuals 9/25/23 9/27 9/29 10/2 10/4 10/10 10/13 10/23 10/26    Patellar PROM (all direction) RG RG RG RG RG RG RG RG RG    PROM R knee flexion NP      RG  RG    PROM R knee extension RG RG RG RG RG RG RG RG RG    STM R HS/Gastroc tendons   RG  RG RG RG RG RG    Neuro Re-Ed                                                                                                        Ther Ex             Bike for ROM and strengthening FWD x 6 min timer FWD 6 min   L1 FWD  6 min L1 FWD  6 min   L2 FWD x 7 min  L2 FWD x 6 min FWD x 5 min L2 FWD x 5 min L2 FWD x 5 min  L1    Quad set with heel prop 5sec hold x 3 min  2.5 min, 10 sec hold 3 min  10sec hold 2.5 min  10sec hold 2.5 min  10 sec hold  10sec hold x 30 10sec hold x 3 min    SAQ 2#  3sec  X 30 2#  3sec x 30 3#  3sec x 30 3# x 30 3# x 30 4# x 30 4# x 30 5# x 30 5# x 30    Long sit HS/gastroc stretch 30sec x 4 30sec x 4 30sec x 4 30sec x 3  30sec x 4 30sec x 4 30sec x 4 30sec x 4 30sec x 4    LAQ 2# x 30 2# x 30 3# x 30 3# x 30 3#  X 30 4# x 30 4# x 30 5#  x 30 5# x 30    Mini squat at rail 5#DB  2 x 15 D/C           Leg press  Seat 7 65#x 12  75#  X 12  85# x 10 85#    1 x 10  2 x 12 95#  3 x 10 95#  3 x 10 95#  3 x 12 105#  3 x 10 105#  3 x 10 105# x 12  115#  2 x 10 115#  3 x 10    SL press 25#  3 x 12 25#  3 x 12 35#  3 x 10 35#  3 x 10 35#  3 x 12 35#    3 x 15  45#   x 10 45#  3 x 10 45#  3 x 10 45#  3 x 10    Tband HS curl Green  2 x 15 NP     Blue  2 x 15 Blue  2 x 15 Blue  2   2 x15    Kesier CC TKE 8.0  5sec x 30 9.0  5sec x 30 1.0  5sec x 30 10.0  5sec x 30 10.0  5sec x 30 12    5sec x 30 12.5 5sec x 30 12.5  5sec x 30 12.5  5sec x 30    Step up (R) 6"   x 20 6" x 20    8" x 10  No UE assist 8" x 10  Up and over x 5 8"  x10   Up and over x 10 YMB (4.4#)  X 10 up,  10 up and over    Step up and over 4" x 20 6" x 10 6" x 20 6" x 20 6"x  20 6" x 20 6" x 20      SLR flexion 3sec  2 x10 3sec  2 x 10  3sec  2  15 3sec  2 x 15 3sec  2 x 15 3sec  2 x 15 3sec  2 x 15  2# 3sec  2#  2  x 15    Low load, long duration R knee extension stretch 3# x 3 min 3# x 3 min 4# x 3 min 4# x 3 min 4# x 3 min NP       Wall squats, pball at back with cane  3sec  1 x 10  1 x 15 With ball sq. 3sec  2 x 10 With ball sq  3sec  2 x 10 With ball sq. 3sec  2 x 10        Slow march with  movement       50' x 2 50' x 2 YMB  50' x 2    Tandem walk       50' x 1 50' x 2     MH Flexion        40#  2 x 15      Walk with 10# weight in L UE to facilitate R hip abductor strengthening.          10# 50' x 2    Ther Activity                                       Gait Training                                       Modalities             Cryo R knee 10 min            MH R knee  10 min 10 min   10 min 10 min 10 min 10 mnin

## 2023-10-31 ENCOUNTER — OFFICE VISIT (OUTPATIENT)
Dept: PHYSICAL THERAPY | Facility: CLINIC | Age: 78
End: 2023-10-31
Payer: COMMERCIAL

## 2023-10-31 DIAGNOSIS — Z96.651 STATUS POST RIGHT KNEE REPLACEMENT: ICD-10-CM

## 2023-10-31 DIAGNOSIS — M17.11 PRIMARY OSTEOARTHRITIS OF RIGHT KNEE: Primary | ICD-10-CM

## 2023-10-31 PROCEDURE — 97140 MANUAL THERAPY 1/> REGIONS: CPT | Performed by: PHYSICAL THERAPIST

## 2023-10-31 PROCEDURE — 97110 THERAPEUTIC EXERCISES: CPT | Performed by: PHYSICAL THERAPIST

## 2023-10-31 NOTE — PROGRESS NOTES
Daily Note     Today's date: 10/31/2023  Patient name: Liban Jimenes  : 1945  MRN: [id number]  Referring provider: Nancy Camargo PA-C  Dx:   Encounter Diagnosis     ICD-10-CM    1. Primary osteoarthritis of right knee  M17.11       2. Status post right knee replacement  Z96.651                        Subjective: Continues to note improvement in strength, swelling, ambulatory ability. Objective: See treatment diary below.  R knee swelling noted today. Patellar mobility improved in all directions. Progressed LE strengthening program  Able to achieve full extension after manuals with only mild effort. Needed some cueing to slow descent with step down when leading with L Leg. Assessment: Tolerated session well. At this time seems to be making week to week progress in the right direction towards goals. Patient would benefit from continued course of skilled physical therapy to address impairments in an effort to improve function. Plan: Continue per plan of care. Likely 3-4 more weeks.      Precautions: none      Manuals 9/25/23 9/27 9/29 10/2 10/4 10/10 10/13 10/23 10/26 10/31   Patellar PROM (all direction) RG RG RG RG RG RG RG RG RG RG   PROM R knee flexion NP      RG  RG    PROM R knee extension RG RG RG RG RG RG RG RG RG RG   STM R HS/Gastroc tendons   RG  RG RG RG RG RG RG  (+) "The Stick" to hamstring/calf   Neuro Re-Ed                                                                                                        Ther Ex             Bike for ROM and strengthening FWD x 6 min timer FWD 6 min   L1 FWD  6 min L1 FWD  6 min   L2 FWD x 7 min  L2 FWD x 6 min FWD x 5 min L2 FWD x 5 min L2 FWD x 5 min  L1 FWD x 5min   L2   Quad set with heel prop 5sec hold x 3 min  2.5 min, 10 sec hold 3 min  10sec hold 2.5 min  10sec hold 2.5 min  10 sec hold  10sec hold x 30 10sec hold x 3 min 10sec hold x 3min   SAQ 2#  3sec  X 30 2#  3sec x 30 3#  3sec x 30 3# x 30 3# x 30 4# x 30 4# x 30 5# x 30 5# x 30 5# x 30   Long sit HS/gastroc stretch 30sec x 4 30sec x 4 30sec x 4 30sec x 3  30sec x 4 30sec x 4 30sec x 4 30sec x 4 30sec x 4 30sec x 4   LAQ 2# x 30 2# x 30 3# x 30 3# x 30 3#  X 30 4# x 30 4# x 30 5#  x 30 5# x 30 5# x 30   Mini squat at rail 5#DB  2 x 15 D/C           Leg press  Seat 7 65#x 12  75#  X 12  85# x 10 85#    1 x 10  2 x 12 95#  3 x 10 95#  3 x 10 95#  3 x 12 105#  3 x 10 105#  3 x 10 105# x 12  115#  2 x 10 115#  3 x 10 115#  3 x 12   SL press 25#  3 x 12 25#  3 x 12 35#  3 x 10 35#  3 x 10 35#  3 x 12 35#    3 x 15  45#   x 10 45#  3 x 10 45#  3 x 10 45#  3 x 10 45#  3 x 12   Tband HS curl Green  2 x 15 NP     Blue  2 x 15 Blue  2 x 15 Blue  2   2 x15 Purple  2 x 15   Kesier CC TKE 8.0  5sec x 30 9.0  5sec x 30 1.0  5sec x 30 10.0  5sec x 30 10.0  5sec x 30 12    5sec x 30 12.5 5sec x 30 12.5  5sec x 30 12.5  5sec x 30 13.5  5sec x 30   Step up (R) 6"   x 20 6" x 20    8" x 10  No UE assist 8" x 10  Up and over x 5 8"  x10   Up and over x 10 YMB (4.4#)  X 10 up,  10 up and over YMB  4.4# x 10 up,  c 10 up and over   Step up and over 4" x 20 6" x 10 6" x 20 6" x 20 6"x  20 6" x 20 6" x 20      SLR flexion 3sec  2 x10 3sec  2 x 10  3sec  2  15 3sec  2 x 15 3sec  2 x 15 3sec  2 x 15 3sec  2 x 15  2# 3sec  2#  2  x 15 3sec  2.5#  2 x 15   Low load, long duration R knee extension stretch 3# x 3 min 3# x 3 min 4# x 3 min 4# x 3 min 4# x 3 min NP       Wall squats, pball at back with cane  3sec  1 x 10  1 x 15 With ball sq. 3sec  2 x 10 With ball sq  3sec  2 x 10 With ball sq. 3sec  2 x 10        Slow march with  movement       50' x 2 50' x 2 YMB  50' x 2 YMB  50' x 3   Tandem walk       50' x 1 50' x 2  50' x2   MH Flexion        40#  2 x 15  40#  2 x 15    Walk with 10# weight in L UE to facilitate R hip abductor strengthening.          10# 50' x 2 10#  50' x 2   Ther Activity                                       Gait Training                                       Modalities Cryo R knee 10 min            MH R knee  10 min 10 min   10 min 10 min 10 min 10 mnin 10 min (last)

## 2023-11-02 ENCOUNTER — OFFICE VISIT (OUTPATIENT)
Dept: PHYSICAL THERAPY | Facility: CLINIC | Age: 78
End: 2023-11-02
Payer: COMMERCIAL

## 2023-11-02 DIAGNOSIS — M17.11 PRIMARY OSTEOARTHRITIS OF RIGHT KNEE: Primary | ICD-10-CM

## 2023-11-02 DIAGNOSIS — Z96.651 STATUS POST RIGHT KNEE REPLACEMENT: ICD-10-CM

## 2023-11-02 PROCEDURE — 97140 MANUAL THERAPY 1/> REGIONS: CPT | Performed by: PHYSICAL THERAPIST

## 2023-11-02 PROCEDURE — 97110 THERAPEUTIC EXERCISES: CPT | Performed by: PHYSICAL THERAPIST

## 2023-11-02 NOTE — PROGRESS NOTES
Daily Note     Today's date: 2023  Patient name: Blayne Julio  : 1945  MRN: [id number]  Referring provider: Aldo Orlando PA-C  Dx:   Encounter Diagnosis     ICD-10-CM    1. Primary osteoarthritis of right knee  M17.11       2. Status post right knee replacement  Z96.651           Start Time: 1031  Stop Time: 1140  Total time in clinic (min): 69 minutes    Subjective: Walked 15 min on TM this AM, did 20 in on bike this AM, did squats with 15# DB in each hand. Knee was very stiff getting out of bed, after exercise has loosened up. Feels he is doing well for 10 weeks post op. Objective: See treatment diary below  R knee flat to table  to start session today (knee extension)  Has strong qaud set while in this position. Still provided manuals and posterior knee stretching to reinforce this. Gait normal about 75% of this time, sometimes with increased R lateral trunk lean during stance on R indicative of hip abductor weakness. Assessment: Tolerated treatment well. Has his best week ywt in therapy as far as gait, ROM concerned. Patient demonstrated fatigue post treatment, exhibited good technique with therapeutic exercises, and would benefit from continued PT      Plan: Continue per plan of care.       Precautions: none      Manuals 23            Patellar PROM (all direction) RG            PROM R knee flexion NP            PROM R knee extension RG            STM R HS/Gastroc tendons, (+) "The Stick" t posterior knee RG            Neuro Re-Ed                                                                                                        Ther Ex             Bike for ROM and strengthening FWD x 5 min  L3            Quad set with heel prop 5sec hold x 3 min            SAQ 6#  3sec  2x 20            Long sit HS/gastroc stretch 30sec x 5            LAQ 6#   2 x 20                         Leg press  Seat 6 115#  3 x 15            SL press 45#  3 x 15            Tband HS curl Purple  2 x 15            [employer] CC TKE 13.5  5sec x 40            Step up (R) 8"   x 10 holding YMB            Step up and over 8" x 10  Holding YMB            SLR flexion 2.5#  3sec  2 x15                                      Slow march with  movement 50' x 2            Tandem walk 50' x 2            MH Flexion 40#  2 x 15             Walk with 10# weight in L UE to facilitate R hip abductor strengthening.  48' x 4            Ther Activity                                       Gait Training                                       Modalities                          MH R knee 10min (last)

## 2023-11-06 ENCOUNTER — OFFICE VISIT (OUTPATIENT)
Dept: PHYSICAL THERAPY | Facility: CLINIC | Age: 78
End: 2023-11-06
Payer: COMMERCIAL

## 2023-11-06 DIAGNOSIS — Z96.651 STATUS POST RIGHT KNEE REPLACEMENT: ICD-10-CM

## 2023-11-06 DIAGNOSIS — M17.11 PRIMARY OSTEOARTHRITIS OF RIGHT KNEE: Primary | ICD-10-CM

## 2023-11-06 PROCEDURE — 97140 MANUAL THERAPY 1/> REGIONS: CPT | Performed by: PHYSICAL THERAPIST

## 2023-11-06 PROCEDURE — 97110 THERAPEUTIC EXERCISES: CPT | Performed by: PHYSICAL THERAPIST

## 2023-11-06 NOTE — PROGRESS NOTES
Progress Note     Today's date: 2023  Patient name: Nargis Perry  : 1945  MRN: [id number]  Referring provider: Selena Grossman PA-C  Dx:   Encounter Diagnosis     ICD-10-CM    1. Primary osteoarthritis of right knee  M17.11       2. Status post right knee replacement  Z96.651                 Assessment  Assessment details: Patient is a 78 y.o. male  s/p R TKA 23  Over the past month pt has made the following progress towards goals:  1) Improved range of motion  2) Improved strength  3  Improved functional ambulatory ability. Continues to make progress now 10 week, 4 days postop. Starting to have an easier time maintaining extension. Gait still dysfunctional, but improving seemingly week to week. Recommend continued short course of therapy, see updated goals below. Understanding of Dx/Px/POC: excellent  Goals  Short Term Goals:  1) Pain : Decrease R knee  pain to 2/10 at worst x 1 continuous week within 4 weeks. -met  2) AROM: Improve R knee AROM to at least 0-110 degrees for flexion,  to have 0 active degrees extension within 4 weeks. - met for flexion, can achieve full extension, but having a hard time maintaining session to session. 3) Strength: Improved R LE strength to at least 4/5 within  4-6 weeks. -met  4) Function: Patient to note greater ease of ambulation subjectively, cane for community ambulation  within 2-3 weeks, no AD within 4-6 weeks. -met    LongTerm Goals:  1) Pain : Eliminate R knee pain  x 1 continuous week within 8-12 weeks. -not yet met  2) Swelling: Eiminate R swelling within 8-12 weeks. -not yet met  3) AROM: Improve R AROM to 0-120 degrees within 6-8 weeks. -not yet met  4) Strength: Improved R LE strength to 5/5 within 8-12 weeks. -not yet met  5) Function: Normalized gait on level ground, reciprocal gait on stairs, and no reported difficulty with ADLs as they pertain to R knee within 12 weeks. -not yet met  FOTO self rated functional scale score to be at least 63 within 6 weeks (21 @ IE). -not yet met  6) (I) with HEP within 12 weeks. -not yet met     Plan  Patient would benefit from: skilled physical therapy  Planned modality interventions: TENS, cryotherapy and thermotherapy: hydrocollator packs  Planned therapy interventions: stretching, strengthening, home exercise program, functional ROM exercises, joint mobilization, neuromuscular re-education, manual therapy, therapeutic exercise and therapeutic activities  Frequency: 2-3x/wk x 4 weeks. Subjective:      Subjective Evaluation     History of Present Illness  Mechanism of injury: Patient is a 78 y.o. old male who presents for an initial outpatient physical therapy evaluation regarding his R knee. PMH significant for R ankle ORIF. Reports insidious onset of R knee pain around April 2023. X-rays (+) for severe lateral knee OA. Steroidal injection via Dr. Deep Khan at that time, received a second injection about three weeks later. Neither injection helped much. With pervasive pain with ADLs, decided to undergo TKA. Has one pre-operative consultation here, surgery was on 8/24/23. One night in hospital, then home with his wife. Current c/o pain, swelling, some difficulty walking/getting around. He is unable to drive at this time. Pain level has been around 5/10 consistently since he left the hospital.  Has been sitting in recliner, watching TV, getting up about once an hour to move around. Using oxycodone for pain control. UPDATED Subjective 9/13/23:   Noting improvement overall in pain level, ability to get around. Feels stiffness in knee. Working on heel props for extension at home. Still having difficulty sleeping with combination knee pain and having to get a up to get to the bathroom. Has stopped use of assistive device. Has not returned to driving. UPDATE 10/2/23:   Overall noting improvement. Has resumed driving. 2/10 pain on average.  Has been worknig hard on home program trying to get knee as straight as possible (extension). Feels strength is improving. Noting improvement going up stairs (can go every other step)  Still going down one step at time. UPDATE 11/6/23:  "I can stillfeel there is a problem there, but I'm getting through it. "  2/10 pain on average. Overall noting improvement in strength, swelling, ambulatory ability. Working hard on HEP, focus on trying to maintain extension. Has the most difficulty walking after periods of sitting, limps until knee loosens up. Has stopped taking Tylenol. Patient Goals: making progress towards personal goals  Patient goal: decrease pain, improve ROM, improve swelling, to be able to walk with greater ease, to be able to return to regular exercise program.     Steps to enter house: yes (x2)  Lives in: multiple-level home (has one story step for exercise)  Lives with: spouse           Objective         Knee   R knee: Surgical incision site healed  Has some redness today that is new since last visit, he was using heat earlier today without an extra layer between hot pack and skin, I think this is what redness is from. Active Range of Motion   Left Knee (as tested previously)  Flexion: 130 degrees   Extension: 0 degrees      Right knee:   Patellar mobility : mild hypomobile moving superiorly. Right Knee   Flexion: 120 degrees   Extension: -1 degrees      Passive Range of Motion  Flexion: 125  degrees with pain  Extension: 0degrees with pain    Able to perform SLR flexion on R witout lag     R knee extension strength /5  R knee flexion strength 5/5    R hip abduction strength: 4+/5     Ryan's sign is (-) bilaterally  Calf squeeze is (-) bilaterally          Gait normal about 75% of this time, sometimes with increased R lateral trunk lean during stance on R indicative of hip abductor weakness. Reciprocal up/down stairs, but lacks eccentric control on R quads when leading down with L leg.   Precautions: none Precautions: none      Manuals 11/2/23 11/6           Patellar PROM (all direction) RG RG           PROM R knee flexion NP            PROM R knee extension RG RG           STM R HS/Gastroc tendons, (+) "The Stick" t posterior knee RG RG           Neuro Re-Ed                                                                                                        Ther Ex             Bike for ROM and strengthening FWD x 5 min  L3 5 min   L3           Quad set with heel prop 5sec hold x 3 min            SAQ 6#  3sec  2x 20 6#  3sec x 30           Long sit HS/gastroc stretch 30sec x 5 30sec x 4           LAQ 6#   2 x 20 6# x 30                        Leg press  Seat 6 115#  3 x 15 115#  2 x 20           SL press 45#  3 x 15 45#  3 x 15           Tband HS curl Purple  2 x 15 Purple  2 x 15           Kesier CC TKE 13.5  5sec x 40 14.0  5sec x 40           Step up (R) 8"   x 10 holding YMB 8" x 1 yolding YMB           Step up and over 8" x 10  Holding YMB 8" x 16 holding YMB           SLR flexion 2.5#  3sec  2 x15 2.5#  3sec  2 x 15                                     Slow march with  movement 50' x 2 YMB  50' x 2           Tandem walk 50' x 2 50' x 2           MH Flexion 40#  2 x 15 40#  2 x 15            Walk with 10# weight in L UE to facilitate R hip abductor strengthening.  48' x 4 50' x 6           Ther Activity                                       Gait Training                                       Modalities                          MH R knee 10min (last) 10min   (Last)

## 2023-11-13 DIAGNOSIS — Z47.1 AFTERCARE FOLLOWING RIGHT KNEE JOINT REPLACEMENT SURGERY: Primary | ICD-10-CM

## 2023-11-13 DIAGNOSIS — Z96.651 AFTERCARE FOLLOWING RIGHT KNEE JOINT REPLACEMENT SURGERY: Primary | ICD-10-CM

## 2023-11-17 ENCOUNTER — OFFICE VISIT (OUTPATIENT)
Dept: PHYSICAL THERAPY | Facility: CLINIC | Age: 78
End: 2023-11-17
Payer: COMMERCIAL

## 2023-11-17 DIAGNOSIS — Z96.651 STATUS POST RIGHT KNEE REPLACEMENT: ICD-10-CM

## 2023-11-17 DIAGNOSIS — M17.11 PRIMARY OSTEOARTHRITIS OF RIGHT KNEE: Primary | ICD-10-CM

## 2023-11-17 PROCEDURE — 97140 MANUAL THERAPY 1/> REGIONS: CPT | Performed by: PHYSICAL THERAPIST

## 2023-11-17 PROCEDURE — 97110 THERAPEUTIC EXERCISES: CPT | Performed by: PHYSICAL THERAPIST

## 2023-11-17 NOTE — PROGRESS NOTES
Daily Note     Today's date: 2023  Patient name: Liban Jimenes  : 1945  MRN: [id number]  Referring provider: Nancy Camargo PA-C  Dx:   No diagnosis found. Subjective:  Lexx tell me he is feeling good. No acute issues with R knee after travel last week including two, eight hour plus car rides. Able to climb hill in [place], [place]. Still some stiffness, soreness, and swelling, but improving week to week. Objective:  Rx as per treatment diary below. Progressed LE strengthening program.       Knee   Still with mild R knee swelling. Now maintaining full extension, still provided overpressure to reinforce this. Gait mechanics improved as knee extension ROM and hip abductor strength has improving. Assessment: Tolerated treatment well                          Functional limitations remain as follows:      - Unresolved pain and stiffness having a negative impact on gait and quality of life. Patient would benefit from continued course of skilled physical therapy to address impairments in an effort to improve function. Nearing discharge. Plan: Continue as per plan of care.              Precautions: none      Manuals 23          Patellar PROM (all direction) RG RG RG          PROM R knee flexion NP            PROM R knee extension RG RG RG          STM R HS/Gastroc tendons, (+) "The Stick" t posterior knee RG RG RG          Neuro Re-Ed                                                                                                        Ther Ex             Bike for ROM and strengthening FWD x 5 min  L3 5 min   L3 5min  L3          Quad set with heel prop 5sec hold x 3 min            SAQ 6#  3sec  2x 20 6#  3sec x 30 7# x 30          Long sit HS/gastroc stretch 30sec x 5 30sec x 4 30se x 3          LAQ 6#   2 x 20 6# x 30 7# x 30                       Leg press  Seat 6 115#  3 x 15 115#  2 x 20 125#  3 x 10          SL press 45#  3 x 15 45#  3 x 15 45#  3 x 15          Tband HS curl Purple  2 x 15 Purple  2 x 15 Purple  2 x 15          Kesier CC TKE 13.5  5sec x 40 14.0  5sec x 40 14.5  5sec x 30          Step up (R) 8"   x 10 holding YMB 8" x 1 yolding YMB           Step up and over 8" x 10  Holding YMB 8" x 16 holding YMB 8"  X 20  Holding YMB          SLR flexion 2.5#  3sec  2 x15 2.5#  3sec  2 x 15 2.5#  2 x 15                                    Slow march with  movement 50' x 2 YMB  50' x 2           Tandem walk 50' x 2 50' x 2 50' x 2          MH Flexion 40#  2 x 15 40#  2 x 15 40#  2 x 15           Walk with 10# weight in L UE to facilitate R hip abductor strengthening.  48' x 4 50' x 6 60' x 4          Ther Activity                                       Gait Training                                       Modalities                          MH R knee 10min (last) 10min   (Last) 10 min

## 2023-11-20 ENCOUNTER — OFFICE VISIT (OUTPATIENT)
Dept: PHYSICAL THERAPY | Facility: CLINIC | Age: 78
End: 2023-11-20
Payer: COMMERCIAL

## 2023-11-20 DIAGNOSIS — Z96.651 STATUS POST RIGHT KNEE REPLACEMENT: ICD-10-CM

## 2023-11-20 DIAGNOSIS — M17.11 PRIMARY OSTEOARTHRITIS OF RIGHT KNEE: Primary | ICD-10-CM

## 2023-11-20 PROCEDURE — 97110 THERAPEUTIC EXERCISES: CPT | Performed by: PHYSICAL THERAPIST

## 2023-11-20 PROCEDURE — 97140 MANUAL THERAPY 1/> REGIONS: CPT | Performed by: PHYSICAL THERAPIST

## 2023-11-20 NOTE — PROGRESS NOTES
Daily Note     Today's date: 2023  Patient name: Isabel Brambila  : 1945  MRN: [id number]  Referring provider: Clark Lewis PA-C  Dx:   Encounter Diagnosis     ICD-10-CM    1. Primary osteoarthritis of right knee  M17.11       2. Status post right knee replacement  Z96.651           Start Time: 8751  Stop Time: 1055  Total time in clinic (min): 62 minutes    Subjective: Did some home exercise this AM. R knee continues to feel better. Less posterior stiffness. Objective: See treatment diary below. Progressed LE strengthening program.  PROM 0-125 degrees flexion  Has 5/5 R knee strength        Assessment: Tolerated treatment well. Posterior knee stiffness decreasing leading to improved gait and an easier time maintaining extension. Overall has made good progress. Plan: Continue per plan of care. Likely one more week of therapy.      Precautions: none      Manuals 23         Patellar PROM (all direction) RG RG RG RG         PROM R knee flexion NP   RG         PROM R knee extension RG RG RG RG         STM R HS/Gastroc tendons, (+) "The Stick" t posterior knee RG RG RG RG         Neuro Re-Ed                                                                                                        Ther Ex             Bike for ROM and strengthening FWD x 5 min  L3 5 min   L3 5min  L3 5min  L3         Quad set with heel prop 5sec hold x 3 min            SAQ 6#  3sec  2x 20 6#  3sec x 30 7# x 30 7# x 30         Long sit HS/gastroc stretch 30sec x 5 30sec x 4 30se x 3 30sec x 3         LAQ 6#   2 x 20 6# x 30 7# x 30 7,5# x 30                      Leg press  Seat 6 115#  3 x 15 115#  2 x 20 125#  3 x 10 125#  3 x 15         SL press 45#  3 x 15 45#  3 x 15 45#  3 x 15 45#3 x 15         Tband HS curl Purple  2 x 15 Purple  2 x 15 Purple  2 x 15 Purple  2 x15         Kesier CC TKE 13.5  5sec x 40 14.0  5sec x 40 14.5  5sec x 30 14.5 5sec x 30         Step up (R) 8"   x 10 holding YMB 8" x 1 yolding YMB           Step up and over 8" x 10  Holding YMB 8" x 16 holding YMB 8"  X 20  Holding YMB 8"  x 20 holding YMB         SLR flexion 2.5#  3sec  2 x15 2.5#  3sec  2 x 15 2.5#  2 x 15                                    Slow march with  movement 50' x 2 YMB  50' x 2  YMB  50' x 2         Tandem walk 50' x 2 50' x 2 50' x 2 50' x 2         MH Flexion 40#  2 x 15 40#  2 x 15 40#  2 x 15 40#  2x 15          Walk with 10# weight in L UE to facilitate R hip abductor strengthening.  50' x 4 50' x 6 60' x 4 15#   50'x 4         Ther Activity                                       Gait Training                                       Modalities                          MH R knee 10min (last) 10min   (Last) 10 min 10 min

## 2023-11-21 ENCOUNTER — HOSPITAL ENCOUNTER (OUTPATIENT)
Dept: RADIOLOGY | Facility: HOSPITAL | Age: 78
Discharge: HOME | End: 2023-11-21
Attending: ORTHOPAEDIC SURGERY
Payer: COMMERCIAL

## 2023-11-21 ENCOUNTER — OFFICE VISIT (OUTPATIENT)
Dept: OBGYN CLINIC | Facility: HOSPITAL | Age: 78
End: 2023-11-21

## 2023-11-21 VITALS
HEART RATE: 90 BPM | BODY MASS INDEX: 28.46 KG/M2 | SYSTOLIC BLOOD PRESSURE: 166 MMHG | DIASTOLIC BLOOD PRESSURE: 75 MMHG | HEIGHT: 65 IN

## 2023-11-21 DIAGNOSIS — Z47.1 AFTERCARE FOLLOWING RIGHT KNEE JOINT REPLACEMENT SURGERY: ICD-10-CM

## 2023-11-21 DIAGNOSIS — Z96.651 AFTERCARE FOLLOWING RIGHT KNEE JOINT REPLACEMENT SURGERY: Primary | ICD-10-CM

## 2023-11-21 DIAGNOSIS — Z47.1 AFTERCARE FOLLOWING RIGHT KNEE JOINT REPLACEMENT SURGERY: Primary | ICD-10-CM

## 2023-11-21 DIAGNOSIS — Z96.651 AFTERCARE FOLLOWING RIGHT KNEE JOINT REPLACEMENT SURGERY: ICD-10-CM

## 2023-11-21 PROCEDURE — 99024 POSTOP FOLLOW-UP VISIT: CPT | Performed by: ORTHOPAEDIC SURGERY

## 2023-11-21 PROCEDURE — 73560 X-RAY EXAM OF KNEE 1 OR 2: CPT

## 2023-11-21 RX ORDER — CEPHALEXIN 500 MG/1
CAPSULE ORAL
Qty: 20 CAPSULE | Refills: 1 | Status: SHIPPED | OUTPATIENT
Start: 2023-11-21 | End: 2025-09-01

## 2023-11-21 NOTE — PROGRESS NOTES
Assessment:   Diagnosis ICD-10-CM Associated Orders   1. Aftercare following right knee joint replacement surgery  Z47.1     Z96.651           Plan:  X-rays taken and reviewed, physical exam performed  Doing well 3 months s/p right TKA  Complete PT to HEP  Total knee precautions with ABX as discussed until 2 years post-op  Weight bearing and activities as tolerated  Swelling should continue to improve    To do next visit:  Return in about 3 months (around 2/21/2024) for re-check with x-rays right knee. The above stated was discussed in layman's terms and the patient expressed understanding. All questions were answered to the patient's satisfaction. Scribe Attestation      I,:  Bhumi Baez am acting as a scribe while in the presence of the attending physician.:       I,:  Desiree Clarke MD personally performed the services described in this documentation    as scribed in my presence.:               Subjective:   Candida Philip is a 78 y.o. male who presents today with his wife for repeat evaluation of his right knee, 3 months s/p right TKA. Overall he is doing very well and is pleased. He presents today without use of an ambulatory assistive device. He has been attending formal PT and progressing. He denies any pain, unless he leans his right knee against his car to strap in his grandchild into their carseat. He is able to resume his walking and recently visited [facility] and did a fair amount of walking on uneven ground, and tolerated the walking well. He finds himself 85-90% "normal". Review of systems negative unless otherwise specified in HPI  Review of Systems    Past Medical History:   Diagnosis Date    Colon polyp     Dermatitis     Prostate CA ([address])     Rotator cuff injury     left    Syncope     6-7 years ago and 10-15 years ago.        Past Surgical History:   Procedure Laterality Date    ANKLE FRACTURE SURGERY      CHOLECYSTECTOMY      COLONOSCOPY      [place] ARTHRP DELISA CONDYLE&PLATU MEDIAL&LAT COMPARTMENTS Right 8/24/2023    Procedure: ARTHROPLASTY KNEE TOTAL W ROBOT;  Surgeon: Indira Ordonez MD;  Location: [facility];  Service: Orthopedics    PROSTATE CANCER GENE 3(PCA3) (HISTORICAL)  04/2019     with radiation       Family History   Problem Relation Age of Onset    Colon cancer Mother     Diabetes Father        Social History     Occupational History    Not on file   Tobacco Use    Smoking status: Former     Packs/day: 0.25     Years: 1.00     Total pack years: 0.25     Types: Cigarettes    Smokeless tobacco: Never    Tobacco comments:     Quit smoking in 1970   Vaping Use    Vaping Use: Never used   Substance and Sexual Activity    Alcohol use: Yes     Alcohol/week: 7.0 standard drinks of alcohol     Types: 7 Glasses of wine per week    Drug use: Never    Sexual activity: Not on file         Current Outpatient Medications:     Multiple Vitamins-Minerals (MULTIVITAMIN WITH MINERALS) tablet, Take 1 tablet by mouth daily. , Disp: , Rfl:     No Known Allergies         Vitals:    11/21/23 1256   BP: 166/75   Pulse: 90       Body mass index is 28.46 kg/m². Wt Readings from Last 3 Encounters:   10/12/23 77.6 kg (171 lb)   08/24/23 78.9 kg (174 lb)   08/03/23 78.9 kg (174 lb)       Objective:                    Right Knee Exam     Muscle Strength   The patient has normal right knee strength. Range of Motion   Extension:  0   Flexion:  120 (patella tracks well)     Other   Erythema: absent  Right knee swelling: modest.    Comments:    Intact extensor mechanism  Healed anterior incision  Minimal warmth, no gross signs of infection  Calf and thigh are soft and non-tender, no signs of DVT  Stable to varus-valgus stressing in both extension and midflexion. Diagnostics, reviewed and taken today if performed as documented:     The attending physician has personally reviewed the pertinent films in PACS and interpretation is as follows:    Right knee x-rays taken and reviewed in the office today and show: well aligned, positioned, and bonded total knee prosthesis without signs of loosening or stress shielding. Procedures, if performed today:    Procedures    None performed      Portions of the record may have been created with voice recognition software. Occasional wrong word or "sound a like" substitutions may have occurred due to the inherent limitations of voice recognition software. Read the chart carefully and recognize, using context, where substitutions have occurred.

## 2023-11-21 NOTE — PROGRESS NOTES
Assessment:   Diagnosis ICD-10-CM Associated Orders   1. Aftercare following right knee joint replacement surgery  Z47.1     Z96.651           Plan:  X-rays taken and reviewed, physical exam performed  Doing well 3 months s/p right TKA. To do next visit:  Return in about 3 months (around 2/21/2024) for re-check with x-rays right knee. The above stated was discussed in layman's terms and the patient expressed understanding. All questions were answered to the patient's satisfaction. Scribe Attestation      I,:  Miguel Baer am acting as a scribe while in the presence of the attending physician.:       I,:  Yamile Machuca MD personally performed the services described in this documentation    as scribed in my presence.:               Subjective:   Tiny Corey is a 78 y.o. male who presents today for repeat evaluation of his right knee. He is 3 months status post right total knee arthroplasty and is overall doing very well. He has been attending physical therapy most recently completed a session yesterday. Review of systems negative unless otherwise specified in HPI  Review of Systems    Past Medical History:   Diagnosis Date    Colon polyp     Dermatitis     Prostate CA ([address])     Rotator cuff injury     left    Syncope     6-7 years ago and 10-15 years ago.        Past Surgical History:   Procedure Laterality Date    ANKLE FRACTURE SURGERY      CHOLECYSTECTOMY      COLONOSCOPY      [place] ARTHRP KNE CONDYLE&PLATU MEDIAL&LAT COMPARTMENTS Right 8/24/2023    Procedure: ARTHROPLASTY KNEE TOTAL W ROBOT;  Surgeon: Yamile Machuca MD;  Location: [facility];  Service: Orthopedics    PROSTATE CANCER GENE 3(PCA3) (HISTORICAL)  04/2019     with radiation       Family History   Problem Relation Age of Onset    Colon cancer Mother     Diabetes Father        Social History     Occupational History    Not on file   Tobacco Use    Smoking status: Former     Packs/day: 0.25     Years: 1.00     Total pack years: 0.25     Types: Cigarettes    Smokeless tobacco: Never    Tobacco comments:     Quit smoking in 1970   Vaping Use    Vaping Use: Never used   Substance and Sexual Activity    Alcohol use: Yes     Alcohol/week: 7.0 standard drinks of alcohol     Types: 7 Glasses of wine per week    Drug use: Never    Sexual activity: Not on file         Current Outpatient Medications:     Multiple Vitamins-Minerals (MULTIVITAMIN WITH MINERALS) tablet, Take 1 tablet by mouth daily. , Disp: , Rfl:     No Known Allergies       There were no vitals filed for this visit. Body mass index is 28.46 kg/m². Wt Readings from Last 3 Encounters:   10/12/23 77.6 kg (171 lb)   08/24/23 78.9 kg (174 lb)   08/03/23 78.9 kg (174 lb)       Objective:                    Ortho Exam    Diagnostics, reviewed and taken today if performed as documented: The attending physician has personally reviewed the pertinent films in PACS and interpretation is as follows:    Right knee x-rays taken and reviewed in the office today and show:       Procedures, if performed today:    Procedures    None performed      Portions of the record may have been created with voice recognition software. Occasional wrong word or "sound a like" substitutions may have occurred due to the inherent limitations of voice recognition software. Read the chart carefully and recognize, using context, where substitutions have occurred.

## 2023-11-27 ENCOUNTER — OFFICE VISIT (OUTPATIENT)
Dept: PHYSICAL THERAPY | Facility: CLINIC | Age: 78
End: 2023-11-27
Payer: COMMERCIAL

## 2023-11-27 DIAGNOSIS — Z96.651 STATUS POST RIGHT KNEE REPLACEMENT: ICD-10-CM

## 2023-11-27 DIAGNOSIS — M17.11 PRIMARY OSTEOARTHRITIS OF RIGHT KNEE: Primary | ICD-10-CM

## 2023-11-27 PROCEDURE — 97110 THERAPEUTIC EXERCISES: CPT

## 2023-11-27 PROCEDURE — 97140 MANUAL THERAPY 1/> REGIONS: CPT

## 2023-11-27 PROCEDURE — 97010 HOT OR COLD PACKS THERAPY: CPT

## 2023-11-27 NOTE — PROGRESS NOTES
Daily Note     Today's date: 2023  Patient name: Orlin Cardoso  : 1945  MRN: [id number]  Referring provider: Luke Victor PA-C  Dx:   Encounter Diagnosis     ICD-10-CM    1. Primary osteoarthritis of right knee  M17.11       2. Status post right knee replacement  Z96.651           Start Time: 0945  Stop Time: 1045  Total time in clinic (min): 60 minutes    Subjective: Patient reports no new complaints since last session. Objective: See treatment diary below. Progressed LE strengthening program.  PROM 0-125 degrees flexion  Has 5/5 R knee strength        Assessment: Tolerated treatment well and appears to be progressing towards goals as demonstrated by ability to tolerate increase to LE weights today with good tolerance and displays good technique t/o session during exercises. Min cueing to look up during tandem walk to challenge balance with good response. Plan: Continue per plan of care. Likely one more week of therapy.      Precautions: none      Manuals 23        Patellar PROM (all direction) RG RG RG RG JM        PROM R knee flexion NP   RG JM        PROM R knee extension RG RG RG RG JM        STM R HS/Gastroc tendons, (+) "The Stick" t posterior knee RG RG RG RG JM        Neuro Re-Ed                                                                                                        Ther Ex             Bike for ROM and strengthening FWD x 5 min  L3 5 min   L3 5min  L3 5min  L3 5min  L3        Quad set with heel prop 5sec hold x 3 min            SAQ 6#  3sec  2x 20 6#  3sec x 30 7# x 30 7# x 30 7,5# x 30        Long sit HS/gastroc stretch 30sec x 5 30sec x 4 30se x 3 30sec x 3 30sec x 3        LAQ 6#   2 x 20 6# x 30 7# x 30 7,5# x 30 7,5# x 30                     Leg press  Seat 6 115#  3 x 15 115#  2 x 20 125#  3 x 10 125#  3 x 15 145#  3x15        SL press 45#  3 x 15 45#  3 x 15 45#  3 x 15 45#3 x 15 45#3 x 15        Tband HS curl Purple  2 x 15 Purple  2 x 15 Purple  2 x 15 Purple  2 x15 Purple  2 x15        Kes[employer] CC TKE 13.5  5sec x 40 14.0  5sec x 40 14.5  5sec x 30 14.5 5sec x 30 14.5 5sec x 30        Step up (R) 8"   x 10 holding YMB 8" x 1 yolding YMB           Step up and over 8" x 10  Holding YMB 8" x 16 holding YMB 8"  X 20  Holding YMB 8"  x 20 holding YMB 8"  x 20 holding YMB        SLR flexion 2.5#  3sec  2 x15 2.5#  3sec  2 x 15 2.5#  2 x 15  2.5#  2 x 15                                  Slow march with  movement 50' x 2 YMB  50' x 2  YMB  50' x 2 YMB  50' x 2        Tandem walk 50' x 2 50' x 2 50' x 2 50' x 2 50' x 2        MH Flexion 40#  2 x 15 40#  2 x 15 40#  2 x 15 40#  2x 15 40#  2x 15         Walk with 10# weight in L UE to facilitate R hip abductor strengthening.  50' x 4 50' x 6 60' x 4 15#   50'x 4 15#   50'x 4        Ther Activity                                       Gait Training                                       Modalities                          MH R knee 10min (last) 10min   (Last) 10 min 10 min 10 min

## 2023-11-29 ENCOUNTER — OFFICE VISIT (OUTPATIENT)
Dept: PHYSICAL THERAPY | Facility: CLINIC | Age: 78
End: 2023-11-29
Payer: COMMERCIAL

## 2023-11-29 DIAGNOSIS — Z96.651 STATUS POST RIGHT KNEE REPLACEMENT: ICD-10-CM

## 2023-11-29 DIAGNOSIS — M17.11 PRIMARY OSTEOARTHRITIS OF RIGHT KNEE: Primary | ICD-10-CM

## 2023-11-29 PROCEDURE — 97110 THERAPEUTIC EXERCISES: CPT | Performed by: PHYSICAL THERAPIST

## 2023-11-29 PROCEDURE — 97140 MANUAL THERAPY 1/> REGIONS: CPT | Performed by: PHYSICAL THERAPIST

## 2023-11-29 NOTE — PROGRESS NOTES
Discharge    Today's date: 2023  Patient name: Remigio Saba  : 1945  MRN: [id number]  Referring provider: Ana M Amaya  Dx:   Encounter Diagnosis     ICD-10-CM    1. Primary osteoarthritis of right knee  M17.11       2. Status post right knee replacement  Z96.651                Assessment  Assessment details: Patient is a 78 y.o. male  s/p R TKA 23  Over the past month pt has made the following progress towards goals:  1) Improved range of motion  2) Improved strength  3)  Improved functional ambulatory ability. 4) Improved FOTO score (FOTO score improved to 82 from 21 @ IE)     Continues to make progress now 13 week, 6 days postop. Has made overall excellent progress. Has a good home exercise program, he is functioning well, and is ready for discharge. Understanding of Dx/Px/POC: excellent  Goals  Short Term Goals:  1) Pain : Decrease R knee  pain to 2/10 at worst x 1 continuous week within 4 weeks. -met  2) AROM: Improve R knee AROM to at least 0-110 degrees for flexion,  to have 0 active degrees extension within 4 weeks. - met for flexion, can achieve full extension, but having a hard time maintaining session to session. 3) Strength: Improved R LE strength to at least 4/5 within  4-6 weeks. -met  4) Function: Patient to note greater ease of ambulation subjectively, cane for community ambulation  within 2-3 weeks, no AD within 4-6 weeks. -met    LongTerm Goals:  1) Pain : Eliminate R knee pain  x 1 continuous week within 8-12 weeks. -not yet met  2) Swelling: Eiminate R swelling within 8-12 weeks. -not yet met  3) AROM: Improve R AROM to 0-120 degrees within 6-8 weeks.-tmet  4) Strength: Improved R LE strength to 5/5 within 8-12 weeks. - met  5) Function: Normalized gait on level ground, reciprocal gait on stairs, and no reported difficulty with ADLs as they pertain to R knee within 12 weeks. - met  FOTO self rated functional scale score to be at least 63 within 6 weeks (21 @ IE).-met  6) (I) with HEP within 12 weeks. - met     Plan  Advised patient to continue with home exercise program.Advised patient to contact me with any future questions or concerns regarding exercise. Pt is in agreement with discharge plan. Subjective:      Subjective Evaluation     History of Present Illness  Mechanism of injury: Patient is a 78 y.o. old male who presents for an initial outpatient physical therapy evaluation regarding his R knee. PMH significant for R ankle ORIF. Reports insidious onset of R knee pain around April 2023. X-rays (+) for severe lateral knee OA. Steroidal injection via Dr. Valente Fiore at that time, received a second injection about three weeks later. Neither injection helped much. With pervasive pain with ADLs, decided to undergo TKA. Has one pre-operative consultation here, surgery was on 8/24/23. One night in hospital, then home with his wife. Current c/o pain, swelling, some difficulty walking/getting around. He is unable to drive at this time. Pain level has been around 5/10 consistently since he left the hospital.  Has been sitting in recliner, watching TV, getting up about once an hour to move around. Using oxycodone for pain control. UPDATED Subjective 9/13/23:   Noting improvement overall in pain level, ability to get around. Feels stiffness in knee. Working on heel props for extension at home. Still having difficulty sleeping with combination knee pain and having to get a up to get to the bathroom. Has stopped use of assistive device. Has not returned to driving. UPDATE 10/2/23:   Overall noting improvement. Has resumed driving. 2/10 pain on average. Has been worknig hard on home program trying to get knee as straight as possible (extension). Feels strength is improving. Noting improvement going up stairs (can go every other step)  Still going down one step at time.       UPDATE 11/6/23:  "I can stillfeel there is a problem there, but I'm getting through it. "  2/10 pain on average. Overall noting improvement in strength, swelling, ambulatory ability. Working hard on HEP, focus on trying to maintain extension. Has the most difficulty walking after periods of sitting, limps until knee loosens up. Has stopped taking Tylenol. UPDATE 11/29/23: Continues to feel better. Did a lot of walking, including uphill during recent trip to [facility] without ill effects. Followed up with Dr. Gwen Boyce 11/21/23 , X-rays looked good. Rarely has pain, still some intermittent stiffness. Patient Goals: making progress towards personal goals  Patient goal: decrease pain, improve ROM, improve swelling, to be able to walk with greater ease, to be able to return to regular exercise program.     Steps to enter house: yes (x2)  Lives in: multiple-level home (has one story step for exercise)  Lives with: spouse           Objective         Knee   R knee: Surgical incision site healed  Has some redness today that is new since last visit, he was using heat earlier today without an extra layer between hot pack and skin, I think this is what redness is from.      Active Assited Range of Motion           Right Knee   Flexion: 122 degrees   Extension: 0  degrees      Passive Range of Motion  Flexion: 125  degrees with pain  Extension: 0 degrees with pain     Able to perform SLR flexion on R witout lag     R knee extension strength /5  R knee flexion strength 5/5    R hip abduction strength: 5/5      Gait : WNL  Reciprocal up/down stairs           Precautions: none      Manuals 11/2/23 11/6 11/17 11/20 11/27 11/29       Patellar PROM (all direction) RG RG RG RG JM RG       PROM R knee flexion NP   RG JM RG       PROM R knee extension RG RG RG RG JM RG       STM R HS/Gastroc tendons, (+) "The Stick" t posterior knee RG RG RG RG JM RG       Neuro Re-Ed                                                                                                        Ther Ex Bike for ROM and strengthening FWD x 5 min  L3 5 min   L3 5min  L3 5min  L3 5min  L3 5min  L3       Quad set with heel prop 5sec hold x 3 min            SAQ 6#  3sec  2x 20 6#  3sec x 30 7# x 30 7# x 30 7,5# x 30 7.5# x 30       Long sit HS/gastroc stretch 30sec x 5 30sec x 4 30se x 3 30sec x 3 30sec x 3 30sec x 4       LAQ 6#   2 x 20 6# x 30 7# x 30 7,5# x 30 7,5# x 30 7.5# x 30                    Leg press  Seat 6 115#  3 x 15 115#  2 x 20 125#  3 x 10 125#  3 x 15 145#  3x15 145#  3 x 15       SL press 45#  3 x 15 45#  3 x 15 45#  3 x 15 45#3 x 15 45#3 x 15 55#  3 x 10       Tband HS curl Purple  2 x 15 Purple  2 x 15 Purple  2 x 15 Purple  2 x15 Purple  2 x15 Purple  2 x 15       Kesier CC TKE 13.5  5sec x 40 14.0  5sec x 40 14.5  5sec x 30 14.5 5sec x 30 14.5 5sec x 30 14.6       Step up (R) 8"   x 10 holding YMB 8" x 1 yolding YMB           Step up and over 8" x 10  Holding YMB 8" x 16 holding YMB 8"  X 20  Holding YMB 8"  x 20 holding YMB 8"  x 20 holding YMB 8" holdng YMB x 20       SLR flexion 2.5#  3sec  2 x15 2.5#  3sec  2 x 15 2.5#  2 x 15  2.5#  2 x 15 2.5#  2 x 15                                 Slow march with  movement 50' x 2 YMB  50' x 2  YMB  50' x 2 YMB  50' x 2 YNB  50' x 4       Tandem walk 50' x 2 50' x 2 50' x 2 50' x 2 50' x 2 50' x 2       MH Flexion 40#  2 x 15 40#  2 x 15 40#  2 x 15 40#  2x 15 40#  2x 15 40#  2 x 15        Walk with 10# weight in L UE to facilitate R hip abductor strengthening.  50' x 4 50' x 6 60' x 4 15#   50'x 4 15#   50'x 4 15#  50' x 4       Ther Activity                                       Gait Training                                       Modalities                          MH R knee 10min (last) 10min   (Last) 10 min 10 min 10 min 10 min

## 2024-02-05 DIAGNOSIS — Z47.1 AFTERCARE FOLLOWING RIGHT KNEE JOINT REPLACEMENT SURGERY: Primary | ICD-10-CM

## 2024-02-05 DIAGNOSIS — Z96.651 AFTERCARE FOLLOWING RIGHT KNEE JOINT REPLACEMENT SURGERY: Primary | ICD-10-CM

## 2024-02-22 ENCOUNTER — OFFICE VISIT (OUTPATIENT)
Dept: OBGYN CLINIC | Facility: HOSPITAL | Age: 79
End: 2024-02-22
Payer: COMMERCIAL

## 2024-02-22 ENCOUNTER — HOSPITAL ENCOUNTER (OUTPATIENT)
Dept: RADIOLOGY | Facility: HOSPITAL | Age: 79
Discharge: HOME | End: 2024-02-22
Attending: ORTHOPAEDIC SURGERY
Payer: COMMERCIAL

## 2024-02-22 VITALS
DIASTOLIC BLOOD PRESSURE: 86 MMHG | HEIGHT: 65 IN | HEART RATE: 79 BPM | BODY MASS INDEX: 28.46 KG/M2 | SYSTOLIC BLOOD PRESSURE: 153 MMHG

## 2024-02-22 DIAGNOSIS — Z47.1 AFTERCARE FOLLOWING RIGHT KNEE JOINT REPLACEMENT SURGERY: ICD-10-CM

## 2024-02-22 DIAGNOSIS — Z96.651 AFTERCARE FOLLOWING RIGHT KNEE JOINT REPLACEMENT SURGERY: ICD-10-CM

## 2024-02-22 DIAGNOSIS — Z96.651 AFTERCARE FOLLOWING RIGHT KNEE JOINT REPLACEMENT SURGERY: Primary | ICD-10-CM

## 2024-02-22 DIAGNOSIS — Z47.1 AFTERCARE FOLLOWING RIGHT KNEE JOINT REPLACEMENT SURGERY: Primary | ICD-10-CM

## 2024-02-22 PROCEDURE — 99213 OFFICE O/P EST LOW 20 MIN: CPT | Performed by: ORTHOPAEDIC SURGERY

## 2024-02-22 PROCEDURE — 73560 X-RAY EXAM OF KNEE 1 OR 2: CPT

## 2024-02-22 NOTE — PROGRESS NOTES
Assessment:  1. Aftercare following right knee joint replacement surgery            Plan:  6 months s/p right TKA with robot, 8/24/2023   He is doing well.    He is encouraged to remain active including HEP.    The patient is counseled on prophylactic antibiotic use prior to dental visits.    He should follow up in 6 months      To do next visit:  Return in about 6 months (around 8/22/2024) for re-check with x-rays.    The above stated was discussed in layman's terms and the patient expressed understanding.  All questions were answered to the patient's satisfaction.       Scribe Attestation      I,:  Krzysztof Jeter am acting as a scribe while in the presence of the attending physician.:       I,:  David Ya MD personally performed the services described in this documentation    as scribed in my presence.:               Subjective:   Lexx Castillo is a 78 y.o. male who presents 6 months s/p right TKA with robot, 8/24/2023.  He is doing well.  Today he has no right knee pain complaints yet can feel stiff in the morning.  He continues his HEP with benefit.  He denies medications for this issue.  He denies fever, chills or shortness of breath.        Review of systems negative unless otherwise specified in HPI    Past Medical History:   Diagnosis Date    Colon polyp     Dermatitis     Prostate CA (HCC)     Rotator cuff injury     left    Syncope     6-7 years ago and 10-15 years ago.       Past Surgical History:   Procedure Laterality Date    ANKLE FRACTURE SURGERY      CHOLECYSTECTOMY      COLONOSCOPY      AR ARTHRP KNE CONDYLE&PLATU MEDIAL&LAT COMPARTMENTS Right 8/24/2023    Procedure: ARTHROPLASTY KNEE TOTAL W ROBOT;  Surgeon: David Ya MD;  Location: [facility];  Service: Orthopedics    PROSTATE CANCER GENE 3(PCA3) (HISTORICAL)  04/2019     with radiation       Family History   Problem Relation Age of Onset    Colon cancer Mother     Diabetes Father        Social History     Occupational  "History    Not on file   Tobacco Use    Smoking status: Former     Current packs/day: 0.25     Average packs/day: 0.3 packs/day for 1 year (0.3 ttl pk-yrs)     Types: Cigarettes    Smokeless tobacco: Never    Tobacco comments:     Quit smoking in 1970   Vaping Use    Vaping status: Never Used   Substance and Sexual Activity    Alcohol use: Yes     Alcohol/week: 7.0 standard drinks of alcohol     Types: 7 Glasses of wine per week    Drug use: Never    Sexual activity: Not on file         Current Outpatient Medications:     cephalexin (KEFLEX) 500 mg capsule, Take 4 tablets p.o. 1 hr prior to dental appointment as instructed, Disp: 20 capsule, Rfl: 1    Multiple Vitamins-Minerals (MULTIVITAMIN WITH MINERALS) tablet, Take 1 tablet by mouth daily., Disp: , Rfl:     No Known Allergies         Vitals:    02/22/24 1451   BP: 153/86   Pulse: 79       Objective:  Physical exam  General: Awake, Alert, Oriented  Eyes: Pupils equal, round and reactive to light  Heart: regular rate and rhythm  Lungs: No audible wheezing  Abdomen: soft                    Ortho Exam  Right knee:  Well healed anterior incision  No erythema and no ecchymosis  Stable to varus and valgus stress  Extensor mechanism intact  Extension 0  Flexion 120  Calf compartments soft and supple  Sensation intact  Toes are warm sensate and mobile      Diagnostics, reviewed and taken today if performed as documented:    The attending physician has personally reviewed the pertinent films in PACS and interpretation is as follows:  Right knee x-ray:  Well aligned prosthesis with no acute changes.      Procedures, if performed today:    Procedures    None performed      Portions of the record may have been created with voice recognition software.  Occasional wrong word or \"sound a like\" substitutions may have occurred due to the inherent limitations of voice recognition software.  Read the chart carefully and recognize, using context, where substitutions have " occurred.

## 2024-08-14 DIAGNOSIS — Z47.1 AFTERCARE FOLLOWING RIGHT KNEE JOINT REPLACEMENT SURGERY: Primary | ICD-10-CM

## 2024-08-14 DIAGNOSIS — Z96.651 AFTERCARE FOLLOWING RIGHT KNEE JOINT REPLACEMENT SURGERY: Primary | ICD-10-CM

## 2024-08-27 ENCOUNTER — OFFICE VISIT (OUTPATIENT)
Dept: OBGYN CLINIC | Facility: HOSPITAL | Age: 79
End: 2024-08-27
Payer: COMMERCIAL

## 2024-08-27 ENCOUNTER — HOSPITAL ENCOUNTER (OUTPATIENT)
Dept: RADIOLOGY | Facility: HOSPITAL | Age: 79
Discharge: HOME | End: 2024-08-27
Attending: ORTHOPAEDIC SURGERY
Payer: COMMERCIAL

## 2024-08-27 VITALS
DIASTOLIC BLOOD PRESSURE: 61 MMHG | SYSTOLIC BLOOD PRESSURE: 147 MMHG | HEIGHT: 65 IN | BODY MASS INDEX: 28.46 KG/M2 | HEART RATE: 71 BPM

## 2024-08-27 DIAGNOSIS — Z47.1 AFTERCARE FOLLOWING RIGHT KNEE JOINT REPLACEMENT SURGERY: ICD-10-CM

## 2024-08-27 DIAGNOSIS — Z96.651 AFTERCARE FOLLOWING RIGHT KNEE JOINT REPLACEMENT SURGERY: ICD-10-CM

## 2024-08-27 DIAGNOSIS — Z96.651 AFTERCARE FOLLOWING RIGHT KNEE JOINT REPLACEMENT SURGERY: Primary | ICD-10-CM

## 2024-08-27 DIAGNOSIS — Z47.1 AFTERCARE FOLLOWING RIGHT KNEE JOINT REPLACEMENT SURGERY: Primary | ICD-10-CM

## 2024-08-27 PROCEDURE — 99213 OFFICE O/P EST LOW 20 MIN: CPT | Performed by: ORTHOPAEDIC SURGERY

## 2024-08-27 PROCEDURE — 73560 X-RAY EXAM OF KNEE 1 OR 2: CPT

## 2024-08-27 NOTE — PROGRESS NOTES
Assessment:   Diagnosis ICD-10-CM Associated Orders   1. Aftercare following right knee joint replacement surgery  Z47.1     Z96.651           Plan:  79 y.o. male 1 year out from a right total knee arthroplasty  Continue weight bearing activities as tolerated   Continue increasing physical activity  Continue OTC pain medications as needed   The patient was reminded about prophylactic antibiotic use prior to dental visits for the next 1.5 years   Follow up in 6 months for 1 year post-op appointment with repeat x-rays       The above stated was discussed in layman's terms and the patient expressed understanding.  All questions were answered to the patient's satisfaction.     To do next visit:  Return if symptoms worsen or fail to improve.      Subjective:   Lexx Castillo is a 79 y.o. male who presents for follow-up of his right total knee arthroplasty.  Patient denies any feelings of instability or new trauma to the knee.  No numbness or tingling.  Patient has been able to weight-bear as tolerated without an assistive device.  Patient reports morning stiffness as well as some swelling at night.  Does not bother him.  Overall he reports reduction of his preoperative pain as well as restoration of his function.      Review of systems negative unless otherwise specified in HPI    Past Medical History:   Diagnosis Date    Colon polyp     Dermatitis     Prostate CA (HCC)     Rotator cuff injury     left    Syncope     6-7 years ago and 10-15 years ago.       Past Surgical History:   Procedure Laterality Date    ANKLE FRACTURE SURGERY      CHOLECYSTECTOMY      COLONOSCOPY      KS ARTHRP KNE CONDYLE&PLATU MEDIAL&LAT COMPARTMENTS Right 8/24/2023    Procedure: ARTHROPLASTY KNEE TOTAL W ROBOT;  Surgeon: David Ya MD;  Location: [facility];  Service: Orthopedics    PROSTATE CANCER GENE 3(PCA3) (HISTORICAL)  04/2019     with radiation       Family History   Problem Relation Age of Onset    Colon cancer Mother      "Diabetes Father        Social History     Occupational History    Not on file   Tobacco Use    Smoking status: Former     Current packs/day: 0.25     Average packs/day: 0.3 packs/day for 1 year (0.3 ttl pk-yrs)     Types: Cigarettes    Smokeless tobacco: Never    Tobacco comments:     Quit smoking in 1970   Vaping Use    Vaping status: Never Used   Substance and Sexual Activity    Alcohol use: Yes     Alcohol/week: 7.0 standard drinks of alcohol     Types: 7 Glasses of wine per week    Drug use: Never    Sexual activity: Not on file         Current Outpatient Medications:     cephalexin (KEFLEX) 500 mg capsule, Take 4 tablets p.o. 1 hr prior to dental appointment as instructed, Disp: 20 capsule, Rfl: 1    Multiple Vitamins-Minerals (MULTIVITAMIN WITH MINERALS) tablet, Take 1 tablet by mouth daily., Disp: , Rfl:     No Known Allergies         Vitals:    08/27/24 1345   BP: 147/61   Pulse: 71       Objective:  Physical exam  General: Awake, Alert, Oriented  Eyes: Pupils equal, round and reactive to light  Heart: regular rate and rhythm  Lungs: No audible wheezing  Abdomen: soft  Examination of the right lower extremity shows a well-healed midline incision, range of motion 0-130, no swelling or effusion noted about the knee, intact ankle dorsiflexion sensation intact distal extremity    Diagnostics, reviewed and taken today if performed as documented:  X-rays obtained at the 1 year jodi show a well aligned implant and well bonded bone implant interface, no evidence of loosening or loss of fixation      Procedures, if performed today:      None performed      Portions of the record may have been created with voice recognition software.  Occasional wrong word or \"sound a like\" substitutions may have occurred due to the inherent limitations of voice recognition software.  Read the chart carefully and recognize, using context, where substitutions have occurred.        "

## 2025-06-18 DIAGNOSIS — Z47.1 AFTERCARE FOLLOWING RIGHT KNEE JOINT REPLACEMENT SURGERY: ICD-10-CM

## 2025-06-18 DIAGNOSIS — Z96.651 AFTERCARE FOLLOWING RIGHT KNEE JOINT REPLACEMENT SURGERY: ICD-10-CM

## 2025-06-18 RX ORDER — CEPHALEXIN 500 MG/1
CAPSULE ORAL
Qty: 40 CAPSULE | Refills: 1 | Status: SHIPPED | OUTPATIENT
Start: 2025-06-18 | End: 2027-03-30

## (undated) DEVICE — DRAPE SHEET X-LG

## (undated) DEVICE — CAPIT KNEE ATTUNE FB W/DOM AOX

## (undated) DEVICE — JP 3-SPRING RES W/10FR PVC DRAIN/TR: Brand: CARDINAL HEALTH

## (undated) DEVICE — SILVER-COATED ANTIMICROBIAL BARRIER DRESSING: Brand: ACTICOAT   4" X 8"

## (undated) DEVICE — VELYS SATEL DRAPE

## (undated) DEVICE — GLOVE SRG BIOGEL 8

## (undated) DEVICE — PROXIMATE PLUS MD MULTI-DIRECTIONAL RELEASE SKIN STAPLERS CONTAINS 35 STAINLESS STEEL STAPLES APPROXIMATE CLOSED DIMENSIONS: 6.9MM X 3.9MM WIDE: Brand: PROXIMATE

## (undated) DEVICE — DUAL CUT SAGITTAL BLADE

## (undated) DEVICE — HOOD: Brand: T7PLUS

## (undated) DEVICE — 3M™ IOBAN™ 2 ANTIMICROBIAL INCISE DRAPE 6650EZ: Brand: IOBAN™ 2

## (undated) DEVICE — PLUMEPEN PRO 10FT

## (undated) DEVICE — PADDING CAST 4 IN  COTTON STRL

## (undated) DEVICE — TRAY FOLEY 16FR URIMETER SURESTEP

## (undated) DEVICE — BETHLEHEM UNIV TOTAL KNEE, KIT: Brand: CARDINAL HEALTH

## (undated) DEVICE — NO-SCRATCH ™ SMALL WHITNEY CURETTE ™ IS A SINGLE-USE, PLASTIC CURETTE FOR QUICKLY APPLYING, MANIPULATING AND REMOVING BONE CEMENT DURING HIP AND KNEE REPLACEMENT SURGERY. THE PLASTIC IS SOFTER THAN STEEL INSTRUMENTS, REDUCING THE RISK OF DAMAGING THE PROSTHESIS WITH METAL INSTRUMENTS.  THE CURETTE’S 6MM TIP REMOVES EXCESS CEMENT FROM REPLACEMENT HIPS AND KNEES. EASY-TO-MANEUVER, THE SMALL BLUE CURETTE LETS YOU REMOVE CEMENT FROM ALL EDGES OF THE PROSTHESIS.NO-SCRATCH WHITNEY SMALL CURETTE FEATURES:SAFER THAN STEEL- MADE OF PLASTIC - STURDY YET SOFTER THAN SURGICAL STEEL.HANDIER- EACH TOOL HAS A MOLDED-IN THUMB INDENTATION INSTANTLY ORIENTING THE TOOL.- EASIER TO MANEUVER IN HARD TO SEE PLACES.- COLOR-CODED FOR EASY IDENTIFICATION.FASTER- COMES INDIVIDUALLY PACKAGED IN STERILE, PEEL OPEN POUCH, READY TO GO.- APPLIES, MANIPULATES, OR REMOVES CEMENT WITH FINGERTIP PRECISION.ECONOMICAL- THE COST OF A SINGLE REVISION DWARFS THE COST OF A SINGLE-USE CURETTE. - DISPOSABLE – THERE’S NO NEED TO WASTE TIME REMOVING HARDENED CEMENT OR RE-STERILIZING TOOLS.- LESS EXPENSIVE TO BUY AND INVENTORY - ORDER ONLY THE TOOL YOU USE.- PACKAGED 25 INDIVIDUALLY WRAPPED TOOLS TO A CARTON FOR CONVENIENT SHELF STORAGE.: Brand: WHITNEY NO-SCRATCH CURETTE (SMALL)

## (undated) DEVICE — RECIPROCATING BLADE, DOUBLE SIDED, OFFSET  (70.0 X 0.64 X 12.6MM)

## (undated) DEVICE — VELYS BLADE SAW OSC 85 X 19 X 2MM

## (undated) DEVICE — HEAVY DUTY TABLE COVER: Brand: CONVERTORS

## (undated) DEVICE — VELYS ROBOT DRAPE

## (undated) DEVICE — DRAPE EQUIPMENT RF WAND

## (undated) DEVICE — STERILE PITCHER PACK: Brand: CARDINAL HEALTH

## (undated) DEVICE — 3 BONE CEMENT MIXER: Brand: MIXEVAC

## (undated) DEVICE — GAUZE SPONGES,16 PLY: Brand: CURITY

## (undated) DEVICE — DRAPE SHEET THREE QUARTER

## (undated) DEVICE — VELYS ROBOT-ASSISTED SOLUTION ARRAY DRILL PIN 125 MM X 4 MM: Brand: VELYS

## (undated) DEVICE — ACE WRAP 6 IN STERILE

## (undated) DEVICE — GLOVE INDICATOR PI UNDERGLOVE SZ 8.5 BLUE

## (undated) DEVICE — SUT VICRYL PLUS 2-0 CTB-1 27 IN VCPB259H

## (undated) DEVICE — HANDPIECE SET WITH RETRACTABLE COAXIAL FAN SPRAY TIP AND SUCTION TUBE: Brand: INTERPULSE

## (undated) DEVICE — STOCKINETTE REGULAR

## (undated) DEVICE — SUT VICRYL PLUS 1 CTB-1 36 IN VCPB947H

## (undated) DEVICE — SPONGE PVP SCRUB WING STERILE

## (undated) DEVICE — HOOD WITH PEEL AWAY FACE SHIELD: Brand: T7PLUS

## (undated) DEVICE — ABDOMINAL PAD: Brand: DERMACEA

## (undated) DEVICE — COOL TEMP PAD

## (undated) DEVICE — VELYS ROBOTIC-ASSISTED SOLUTION ARRAY SET - KNEE: Brand: VELYS